# Patient Record
Sex: FEMALE | Race: WHITE | URBAN - METROPOLITAN AREA
[De-identification: names, ages, dates, MRNs, and addresses within clinical notes are randomized per-mention and may not be internally consistent; named-entity substitution may affect disease eponyms.]

---

## 2020-07-27 ENCOUNTER — HOSPITAL ENCOUNTER (OUTPATIENT)
Age: 40
Discharge: HOME | End: 2020-07-29
Payer: COMMERCIAL

## 2020-07-27 PROCEDURE — 87205 SMEAR GRAM STAIN: CPT

## 2020-07-28 LAB — GRAM STAIN ORDERABLE: NORMAL

## 2023-04-20 ENCOUNTER — OFFICE VISIT (OUTPATIENT)
Dept: PRIMARY CARE | Facility: CLINIC | Age: 43
End: 2023-04-20
Payer: COMMERCIAL

## 2023-04-20 VITALS
BODY MASS INDEX: 48.82 KG/M2 | HEIGHT: 65 IN | HEART RATE: 88 BPM | WEIGHT: 293 LBS | DIASTOLIC BLOOD PRESSURE: 74 MMHG | RESPIRATION RATE: 18 BRPM | TEMPERATURE: 97.2 F | SYSTOLIC BLOOD PRESSURE: 136 MMHG | OXYGEN SATURATION: 96 %

## 2023-04-20 DIAGNOSIS — E66.01 MORBID OBESITY (MULTI): ICD-10-CM

## 2023-04-20 DIAGNOSIS — I10 PRIMARY HYPERTENSION: Primary | ICD-10-CM

## 2023-04-20 DIAGNOSIS — F32.A DEPRESSION, UNSPECIFIED DEPRESSION TYPE: ICD-10-CM

## 2023-04-20 PROBLEM — J45.909 ASTHMA (HHS-HCC): Status: ACTIVE | Noted: 2023-04-20

## 2023-04-20 PROBLEM — D61.818 PANCYTOPENIA (MULTI): Status: ACTIVE | Noted: 2023-04-20

## 2023-04-20 PROBLEM — K21.9 GERD WITHOUT ESOPHAGITIS: Status: ACTIVE | Noted: 2023-04-20

## 2023-04-20 PROBLEM — E55.9 VITAMIN D DEFICIENCY: Status: ACTIVE | Noted: 2023-04-20

## 2023-04-20 PROCEDURE — 1036F TOBACCO NON-USER: CPT | Performed by: NURSE PRACTITIONER

## 2023-04-20 PROCEDURE — 99213 OFFICE O/P EST LOW 20 MIN: CPT | Performed by: NURSE PRACTITIONER

## 2023-04-20 PROCEDURE — 3075F SYST BP GE 130 - 139MM HG: CPT | Performed by: NURSE PRACTITIONER

## 2023-04-20 PROCEDURE — 3078F DIAST BP <80 MM HG: CPT | Performed by: NURSE PRACTITIONER

## 2023-04-20 RX ORDER — FLUTICASONE FUROATE 27.5 UG/1
2 SPRAY, METERED NASAL
COMMUNITY
End: 2024-04-22 | Stop reason: WASHOUT

## 2023-04-20 RX ORDER — ALBUTEROL SULFATE 90 UG/1
AEROSOL, METERED RESPIRATORY (INHALATION) 4 TIMES DAILY
COMMUNITY
Start: 2023-02-16

## 2023-04-20 RX ORDER — OMEPRAZOLE 20 MG/1
CAPSULE, DELAYED RELEASE ORAL
COMMUNITY
Start: 2022-07-18 | End: 2023-08-29 | Stop reason: SDUPTHER

## 2023-04-20 RX ORDER — LOSARTAN POTASSIUM 50 MG/1
1 TABLET ORAL DAILY
COMMUNITY
Start: 2022-07-18 | End: 2023-08-29 | Stop reason: SDUPTHER

## 2023-04-20 RX ORDER — FLUOXETINE HYDROCHLORIDE 40 MG/1
1 CAPSULE ORAL DAILY
COMMUNITY
Start: 2022-07-18 | End: 2023-06-06

## 2023-04-20 RX ORDER — MELOXICAM 15 MG/1
1 TABLET ORAL DAILY
COMMUNITY
Start: 2023-03-30 | End: 2024-03-26 | Stop reason: ALTCHOICE

## 2023-04-20 RX ORDER — MONTELUKAST SODIUM 4 MG/1
4 TABLET, CHEWABLE ORAL NIGHTLY
COMMUNITY
End: 2023-08-29 | Stop reason: SINTOL

## 2023-04-20 NOTE — PROGRESS NOTES
Subjective   Rhonda Pizano is a 42 y.o. female who presents for Follow-up (6month ).  Taking care of her mom at home.  Obesity- Ins will cover saxenda  but too expensive. Wants to not go down the same path as her mom.   No CP but will have fluttering occasionally.   NO abd pain N&V.  ETOH- none  No nicotene  Exercise- none  Walk at work.           The ROS have been reviewed otherwise negative except for what is in the HPI    Objective   Physical Exam  Constitutional:       Appearance: Normal appearance.   Cardiovascular:      Rate and Rhythm: Normal rate and regular rhythm.   Pulmonary:      Effort: Pulmonary effort is normal.      Breath sounds: Normal breath sounds.   Neurological:      Mental Status: She is alert and oriented to person, place, and time.   Psychiatric:         Mood and Affect: Mood normal.       Visit Vitals  /74   Pulse 88   Temp 36.2 °C (97.2 °F)   Resp 18        Assessment/Plan   Problem List Items Addressed This Visit    None    Rhonda was seen today for follow-up.  Diagnoses and all orders for this visit:  Primary hypertension (Primary)  Comments:  controlled on losartan  Orders:  -     Comprehensive Metabolic Panel; Future  -     TSH with reflex to Free T4 if abnormal; Future  -     CBC and Auto Differential; Future  Depression, unspecified depression type  Comments:  on fluoxetine  Orders:  -     TSH with reflex to Free T4 if abnormal; Future  Morbid obesity (CMS/HCC)  Comments:  she will call insurance to check diff meds.  Orders:  -     Lipid Panel; Future  -     Hemoglobin A1C; Future  -     TSH with reflex to Free T4 if abnormal; Future

## 2023-05-04 ENCOUNTER — LAB (OUTPATIENT)
Dept: LAB | Facility: LAB | Age: 43
End: 2023-05-04
Payer: COMMERCIAL

## 2023-05-04 DIAGNOSIS — F32.A DEPRESSION, UNSPECIFIED DEPRESSION TYPE: ICD-10-CM

## 2023-05-04 DIAGNOSIS — I10 PRIMARY HYPERTENSION: ICD-10-CM

## 2023-05-04 DIAGNOSIS — E66.01 MORBID OBESITY (MULTI): ICD-10-CM

## 2023-05-04 LAB
ALANINE AMINOTRANSFERASE (SGPT) (U/L) IN SER/PLAS: 16 U/L (ref 7–45)
ALBUMIN (G/DL) IN SER/PLAS: 4 G/DL (ref 3.4–5)
ALKALINE PHOSPHATASE (U/L) IN SER/PLAS: 84 U/L (ref 33–110)
ANION GAP IN SER/PLAS: 11 MMOL/L (ref 10–20)
ASPARTATE AMINOTRANSFERASE (SGOT) (U/L) IN SER/PLAS: 15 U/L (ref 9–39)
BASOPHILS (10*3/UL) IN BLOOD BY AUTOMATED COUNT: 0.07 X10E9/L (ref 0–0.1)
BASOPHILS/100 LEUKOCYTES IN BLOOD BY AUTOMATED COUNT: 0.9 % (ref 0–2)
BILIRUBIN TOTAL (MG/DL) IN SER/PLAS: 0.7 MG/DL (ref 0–1.2)
CALCIUM (MG/DL) IN SER/PLAS: 9.3 MG/DL (ref 8.6–10.3)
CARBON DIOXIDE, TOTAL (MMOL/L) IN SER/PLAS: 29 MMOL/L (ref 21–32)
CHLORIDE (MMOL/L) IN SER/PLAS: 102 MMOL/L (ref 98–107)
CHOLESTEROL (MG/DL) IN SER/PLAS: 149 MG/DL (ref 0–199)
CHOLESTEROL IN HDL (MG/DL) IN SER/PLAS: 37.1 MG/DL
CHOLESTEROL/HDL RATIO: 4
CREATININE (MG/DL) IN SER/PLAS: 0.83 MG/DL (ref 0.5–1.05)
EOSINOPHILS (10*3/UL) IN BLOOD BY AUTOMATED COUNT: 0.11 X10E9/L (ref 0–0.7)
EOSINOPHILS/100 LEUKOCYTES IN BLOOD BY AUTOMATED COUNT: 1.3 % (ref 0–6)
ERYTHROCYTE DISTRIBUTION WIDTH (RATIO) BY AUTOMATED COUNT: 13.5 % (ref 11.5–14.5)
ERYTHROCYTE MEAN CORPUSCULAR HEMOGLOBIN CONCENTRATION (G/DL) BY AUTOMATED: 32 G/DL (ref 32–36)
ERYTHROCYTE MEAN CORPUSCULAR VOLUME (FL) BY AUTOMATED COUNT: 85 FL (ref 80–100)
ERYTHROCYTES (10*6/UL) IN BLOOD BY AUTOMATED COUNT: 5.12 X10E12/L (ref 4–5.2)
GFR FEMALE: 89 ML/MIN/1.73M2
GLUCOSE (MG/DL) IN SER/PLAS: 86 MG/DL (ref 74–99)
HEMATOCRIT (%) IN BLOOD BY AUTOMATED COUNT: 43.7 % (ref 36–46)
HEMOGLOBIN (G/DL) IN BLOOD: 14 G/DL (ref 12–16)
IMMATURE GRANULOCYTES/100 LEUKOCYTES IN BLOOD BY AUTOMATED COUNT: 0.4 % (ref 0–0.9)
LDL: 90 MG/DL (ref 0–99)
LEUKOCYTES (10*3/UL) IN BLOOD BY AUTOMATED COUNT: 8.2 X10E9/L (ref 4.4–11.3)
LYMPHOCYTES (10*3/UL) IN BLOOD BY AUTOMATED COUNT: 2.03 X10E9/L (ref 1.2–4.8)
LYMPHOCYTES/100 LEUKOCYTES IN BLOOD BY AUTOMATED COUNT: 24.7 % (ref 13–44)
MONOCYTES (10*3/UL) IN BLOOD BY AUTOMATED COUNT: 0.54 X10E9/L (ref 0.1–1)
MONOCYTES/100 LEUKOCYTES IN BLOOD BY AUTOMATED COUNT: 6.6 % (ref 2–10)
NEUTROPHILS (10*3/UL) IN BLOOD BY AUTOMATED COUNT: 5.45 X10E9/L (ref 1.2–7.7)
NEUTROPHILS/100 LEUKOCYTES IN BLOOD BY AUTOMATED COUNT: 66.1 % (ref 40–80)
PLATELETS (10*3/UL) IN BLOOD AUTOMATED COUNT: 360 X10E9/L (ref 150–450)
POTASSIUM (MMOL/L) IN SER/PLAS: 4.8 MMOL/L (ref 3.5–5.3)
PROTEIN TOTAL: 6.3 G/DL (ref 6.4–8.2)
SODIUM (MMOL/L) IN SER/PLAS: 137 MMOL/L (ref 136–145)
THYROTROPIN (MIU/L) IN SER/PLAS BY DETECTION LIMIT <= 0.05 MIU/L: 1.03 MIU/L (ref 0.44–3.98)
TRIGLYCERIDE (MG/DL) IN SER/PLAS: 111 MG/DL (ref 0–149)
UREA NITROGEN (MG/DL) IN SER/PLAS: 11 MG/DL (ref 6–23)
VLDL: 22 MG/DL (ref 0–40)

## 2023-05-04 PROCEDURE — 80061 LIPID PANEL: CPT

## 2023-05-04 PROCEDURE — 36415 COLL VENOUS BLD VENIPUNCTURE: CPT

## 2023-05-04 PROCEDURE — 85025 COMPLETE CBC W/AUTO DIFF WBC: CPT

## 2023-05-04 PROCEDURE — 80053 COMPREHEN METABOLIC PANEL: CPT

## 2023-05-04 PROCEDURE — 84443 ASSAY THYROID STIM HORMONE: CPT

## 2023-05-04 PROCEDURE — 83036 HEMOGLOBIN GLYCOSYLATED A1C: CPT

## 2023-05-05 LAB
ESTIMATED AVERAGE GLUCOSE FOR HBA1C: 97 MG/DL
HEMOGLOBIN A1C/HEMOGLOBIN TOTAL IN BLOOD: 5 %

## 2023-06-04 DIAGNOSIS — J45.909 ASTHMA, UNSPECIFIED ASTHMA SEVERITY, UNSPECIFIED WHETHER COMPLICATED, UNSPECIFIED WHETHER PERSISTENT (HHS-HCC): Primary | ICD-10-CM

## 2023-06-04 DIAGNOSIS — F32.A DEPRESSION, UNSPECIFIED DEPRESSION TYPE: ICD-10-CM

## 2023-06-06 RX ORDER — FLUOXETINE HYDROCHLORIDE 40 MG/1
CAPSULE ORAL
Qty: 90 CAPSULE | Refills: 1 | Status: SHIPPED | OUTPATIENT
Start: 2023-06-06 | End: 2023-08-29 | Stop reason: SDUPTHER

## 2023-06-06 RX ORDER — MONTELUKAST SODIUM 10 MG/1
TABLET ORAL
Qty: 90 TABLET | Refills: 1 | Status: SHIPPED | OUTPATIENT
Start: 2023-06-06 | End: 2023-08-29 | Stop reason: SDUPTHER

## 2023-08-29 ENCOUNTER — OFFICE VISIT (OUTPATIENT)
Dept: PRIMARY CARE | Facility: CLINIC | Age: 43
End: 2023-08-29
Payer: COMMERCIAL

## 2023-08-29 VITALS
OXYGEN SATURATION: 98 % | BODY MASS INDEX: 50.02 KG/M2 | TEMPERATURE: 97 F | DIASTOLIC BLOOD PRESSURE: 80 MMHG | SYSTOLIC BLOOD PRESSURE: 120 MMHG | HEIGHT: 64 IN | WEIGHT: 293 LBS | HEART RATE: 87 BPM

## 2023-08-29 DIAGNOSIS — F32.A DEPRESSION, UNSPECIFIED DEPRESSION TYPE: ICD-10-CM

## 2023-08-29 DIAGNOSIS — J45.909 ASTHMA, UNSPECIFIED ASTHMA SEVERITY, UNSPECIFIED WHETHER COMPLICATED, UNSPECIFIED WHETHER PERSISTENT (HHS-HCC): ICD-10-CM

## 2023-08-29 DIAGNOSIS — K21.9 GERD WITHOUT ESOPHAGITIS: ICD-10-CM

## 2023-08-29 DIAGNOSIS — I10 PRIMARY HYPERTENSION: Primary | ICD-10-CM

## 2023-08-29 PROCEDURE — 3079F DIAST BP 80-89 MM HG: CPT

## 2023-08-29 PROCEDURE — 1036F TOBACCO NON-USER: CPT

## 2023-08-29 PROCEDURE — 99213 OFFICE O/P EST LOW 20 MIN: CPT

## 2023-08-29 PROCEDURE — 3074F SYST BP LT 130 MM HG: CPT

## 2023-08-29 RX ORDER — OMEPRAZOLE 20 MG/1
20 CAPSULE, DELAYED RELEASE ORAL
Qty: 90 CAPSULE | Refills: 1 | Status: SHIPPED | OUTPATIENT
Start: 2023-08-29 | End: 2024-01-26

## 2023-08-29 RX ORDER — FLUOXETINE HYDROCHLORIDE 40 MG/1
40 CAPSULE ORAL DAILY
Qty: 90 CAPSULE | Refills: 1 | Status: SHIPPED | OUTPATIENT
Start: 2023-08-29 | End: 2024-01-26

## 2023-08-29 RX ORDER — MONTELUKAST SODIUM 10 MG/1
10 TABLET ORAL NIGHTLY
Qty: 90 TABLET | Refills: 1 | Status: SHIPPED | OUTPATIENT
Start: 2023-08-29 | End: 2024-01-26

## 2023-08-29 RX ORDER — LOSARTAN POTASSIUM 50 MG/1
50 TABLET ORAL DAILY
Qty: 90 TABLET | Refills: 1 | Status: SHIPPED | OUTPATIENT
Start: 2023-08-29 | End: 2024-01-26

## 2023-08-29 ASSESSMENT — PATIENT HEALTH QUESTIONNAIRE - PHQ9
1. LITTLE INTEREST OR PLEASURE IN DOING THINGS: NOT AT ALL
SUM OF ALL RESPONSES TO PHQ9 QUESTIONS 1 & 2: 0
2. FEELING DOWN, DEPRESSED OR HOPELESS: NOT AT ALL

## 2023-08-29 ASSESSMENT — ENCOUNTER SYMPTOMS
PSYCHIATRIC NEGATIVE: 1
ABDOMINAL PAIN: 0
RESPIRATORY NEGATIVE: 1
MUSCULOSKELETAL NEGATIVE: 1
CARDIOVASCULAR NEGATIVE: 1
FEVER: 0
ENDOCRINE NEGATIVE: 1
HEADACHES: 0
FATIGUE: 0
LOSS OF SENSATION IN FEET: 0
DEPRESSION: 0
ALLERGIC/IMMUNOLOGIC NEGATIVE: 1
DIZZINESS: 0
SHORTNESS OF BREATH: 0
GASTROINTESTINAL NEGATIVE: 1
WEAKNESS: 0
ACTIVITY CHANGE: 0
OCCASIONAL FEELINGS OF UNSTEADINESS: 0
UNEXPECTED WEIGHT CHANGE: 0

## 2023-08-29 ASSESSMENT — SOCIAL DETERMINANTS OF HEALTH (SDOH)

## 2023-08-29 NOTE — PATIENT INSTRUCTIONS
Healthy diet and exercise  Consider intemittent fasting, limit sugars, carbohydrates    Thank you for coming in today, if any questions or concerns arise, please call my office.   Clem Schmitt, GRISELDA-CNP

## 2023-08-29 NOTE — PROGRESS NOTES
Subjective   Patient ID: Rhonda Pizano is a 42 y.o. female who presents for Establish Care (Rhonda is here to establish care. She is seeing ortho for knee pain. Needs refills. ).  Has been on Amlodipine, caused leg swelling, was on hydrochlorothiazide, did not lower the BP  Lisinopril caused severe cough.         Vitals:    08/29/23 1537   BP: 120/80   Pulse: 87   Temp: 36.1 °C (97 °F)   SpO2: 98%       Review of Systems   Constitutional:  Negative for activity change, fatigue, fever and unexpected weight change.   HENT: Negative.     Respiratory: Negative.  Negative for shortness of breath.    Cardiovascular: Negative.  Negative for chest pain.   Gastrointestinal: Negative.  Negative for abdominal pain.   Endocrine: Negative.    Musculoskeletal: Negative.    Skin: Negative.    Allergic/Immunologic: Negative.    Neurological:  Negative for dizziness, weakness and headaches.   Psychiatric/Behavioral: Negative.     All other systems reviewed and are negative.      Objective   Physical Exam  Vitals and nursing note reviewed.   Constitutional:       Appearance: Normal appearance.   HENT:      Head: Normocephalic.      Mouth/Throat:      Mouth: Mucous membranes are moist.   Cardiovascular:      Rate and Rhythm: Normal rate and regular rhythm.      Pulses: Normal pulses.      Heart sounds: Normal heart sounds. No murmur heard.     No friction rub. No gallop.   Pulmonary:      Effort: Pulmonary effort is normal. No respiratory distress.      Breath sounds: Normal breath sounds. No wheezing.   Abdominal:      General: Bowel sounds are normal. There is no distension.      Palpations: Abdomen is soft.      Tenderness: There is no abdominal tenderness.   Musculoskeletal:         General: No deformity. Normal range of motion.   Skin:     General: Skin is warm and dry.      Capillary Refill: Capillary refill takes less than 2 seconds.   Neurological:      General: No focal deficit present.      Mental Status: She is alert and  oriented to person, place, and time.   Psychiatric:         Mood and Affect: Mood normal.         Assessment/Plan   Problem List Items Addressed This Visit       Asthma    Relevant Medications    montelukast (Singulair) 10 mg tablet    Depression    Relevant Medications    FLUoxetine (PROzac) 40 mg capsule    GERD without esophagitis    Relevant Medications    omeprazole (PriLOSEC) 20 mg DR capsule    HTN (hypertension) - Primary    Relevant Medications    losartan (Cozaar) 50 mg tablet            Thank you for coming in today, please call my office if you have any concerns or questions.     Clem VILLALOBOS, CNP

## 2023-10-06 ENCOUNTER — APPOINTMENT (OUTPATIENT)
Dept: ORTHOPEDIC SURGERY | Facility: CLINIC | Age: 43
End: 2023-10-06
Payer: COMMERCIAL

## 2023-10-09 ENCOUNTER — APPOINTMENT (OUTPATIENT)
Dept: PRIMARY CARE | Facility: CLINIC | Age: 43
End: 2023-10-09
Payer: COMMERCIAL

## 2023-10-12 DIAGNOSIS — M25.561 RIGHT KNEE PAIN, UNSPECIFIED CHRONICITY: Primary | ICD-10-CM

## 2023-10-13 ENCOUNTER — OFFICE VISIT (OUTPATIENT)
Dept: ORTHOPEDIC SURGERY | Facility: CLINIC | Age: 43
End: 2023-10-13
Payer: COMMERCIAL

## 2023-10-13 DIAGNOSIS — M25.561 CHRONIC PAIN OF RIGHT KNEE: ICD-10-CM

## 2023-10-13 DIAGNOSIS — G89.29 CHRONIC PAIN OF RIGHT KNEE: ICD-10-CM

## 2023-10-13 PROBLEM — M77.11 LATERAL EPICONDYLITIS, RIGHT ELBOW: Status: ACTIVE | Noted: 2023-10-13

## 2023-10-13 PROBLEM — E03.9 HYPOTHYROIDISM: Status: ACTIVE | Noted: 2019-09-23

## 2023-10-13 PROBLEM — M65.4 RADIAL STYLOID TENOSYNOVITIS OF RIGHT HAND: Status: ACTIVE | Noted: 2021-09-03

## 2023-10-13 PROBLEM — G47.33 OSA (OBSTRUCTIVE SLEEP APNEA): Status: ACTIVE | Noted: 2019-09-23

## 2023-10-13 PROBLEM — I10 BENIGN ESSENTIAL HTN: Status: ACTIVE | Noted: 2023-10-13

## 2023-10-13 PROBLEM — S63.501A UNSPECIFIED SPRAIN OF RIGHT WRIST, INITIAL ENCOUNTER: Status: ACTIVE | Noted: 2021-09-03

## 2023-10-13 PROBLEM — S69.82XA: Status: ACTIVE | Noted: 2021-09-03

## 2023-10-13 PROBLEM — E66.01 SEVERE OBESITY (BMI >= 40) (MULTI): Status: ACTIVE | Noted: 2021-09-03

## 2023-10-13 PROBLEM — M24.9 JOINT DERANGEMENT, SHOULDER REGION: Status: ACTIVE | Noted: 2023-10-13

## 2023-10-13 PROBLEM — R53.82 CHRONIC FATIGUE: Status: ACTIVE | Noted: 2023-10-13

## 2023-10-13 PROCEDURE — 1036F TOBACCO NON-USER: CPT

## 2023-10-13 PROCEDURE — 99213 OFFICE O/P EST LOW 20 MIN: CPT

## 2023-10-13 RX ORDER — CYCLOBENZAPRINE HCL 10 MG
10 TABLET ORAL 3 TIMES DAILY PRN
COMMUNITY
Start: 2022-07-07 | End: 2024-02-23

## 2023-10-13 RX ORDER — OMEPRAZOLE 10 MG/1
10 CAPSULE, DELAYED RELEASE ORAL
COMMUNITY
End: 2024-03-26 | Stop reason: ALTCHOICE

## 2023-10-13 RX ORDER — FLUOXETINE HYDROCHLORIDE 20 MG/1
1 CAPSULE ORAL DAILY
COMMUNITY
Start: 2022-04-25 | End: 2024-03-26 | Stop reason: ALTCHOICE

## 2023-10-13 RX ORDER — DULOXETIN HYDROCHLORIDE 20 MG/1
20 CAPSULE, DELAYED RELEASE ORAL
COMMUNITY
Start: 2021-10-29 | End: 2024-03-26 | Stop reason: ALTCHOICE

## 2023-10-13 RX ORDER — ONDANSETRON 4 MG/1
1 TABLET, FILM COATED ORAL EVERY 4 HOURS
COMMUNITY
Start: 2017-01-22 | End: 2024-02-23

## 2023-10-13 RX ORDER — POTASSIUM CHLORIDE 20 MEQ/1
1 TABLET, EXTENDED RELEASE ORAL 2 TIMES DAILY
COMMUNITY
End: 2024-02-23

## 2023-10-13 RX ORDER — LORATADINE 10 MG/1
1 TABLET ORAL DAILY
COMMUNITY
Start: 2022-03-23 | End: 2024-04-22 | Stop reason: WASHOUT

## 2023-10-13 RX ORDER — IPRATROPIUM BROMIDE AND ALBUTEROL SULFATE 2.5; .5 MG/3ML; MG/3ML
3 SOLUTION RESPIRATORY (INHALATION) EVERY 6 HOURS PRN
COMMUNITY
Start: 2019-02-14 | End: 2024-03-26 | Stop reason: ALTCHOICE

## 2023-10-13 RX ORDER — ACETAMINOPHEN 325 MG/1
TABLET ORAL
COMMUNITY
End: 2024-04-04 | Stop reason: HOSPADM

## 2023-10-13 RX ORDER — DOXYCYCLINE HYCLATE 100 MG
100 TABLET ORAL 2 TIMES DAILY
COMMUNITY
End: 2024-02-23

## 2023-10-13 RX ORDER — IBUPROFEN 600 MG/1
600 TABLET ORAL EVERY 6 HOURS PRN
COMMUNITY
End: 2024-03-26 | Stop reason: ALTCHOICE

## 2023-10-13 RX ORDER — FLUTICASONE FUROATE AND VILANTEROL 100; 25 UG/1; UG/1
1-2 POWDER RESPIRATORY (INHALATION) 2 TIMES DAILY
COMMUNITY
Start: 2021-07-28 | End: 2024-02-23

## 2023-10-13 RX ORDER — ACETAMINOPHEN 500 MG
1 TABLET ORAL DAILY
COMMUNITY

## 2023-10-13 RX ORDER — CHOLECALCIFEROL (VITAMIN D3) 25 MCG
1000 TABLET ORAL DAILY
COMMUNITY
End: 2024-02-23

## 2023-10-13 RX ORDER — AZITHROMYCIN 250 MG/1
TABLET, FILM COATED ORAL 3 TIMES DAILY
COMMUNITY
Start: 2023-02-16 | End: 2024-02-23

## 2023-10-13 RX ORDER — GUAIFENESIN 100 MG/5ML
10 SOLUTION ORAL EVERY 4 HOURS PRN
COMMUNITY
Start: 2017-11-06 | End: 2024-03-26 | Stop reason: ALTCHOICE

## 2023-10-13 RX ORDER — EPINEPHRINE 0.3 MG/.3ML
INJECTION SUBCUTANEOUS
COMMUNITY
Start: 2020-05-11

## 2023-10-13 RX ORDER — CHOLECALCIFEROL (VITAMIN D3) 50 MCG
50 TABLET ORAL DAILY
COMMUNITY
Start: 2021-05-04 | End: 2024-03-26 | Stop reason: SDUPTHER

## 2023-10-13 RX ORDER — AMLODIPINE BESYLATE 5 MG/1
TABLET ORAL
COMMUNITY
End: 2024-02-23

## 2023-10-13 RX ORDER — NAPROXEN 500 MG/1
500 TABLET ORAL 2 TIMES DAILY
Qty: 60 TABLET | Refills: 0 | Status: SHIPPED | OUTPATIENT
Start: 2023-10-13 | End: 2023-11-13 | Stop reason: SDUPTHER

## 2023-10-13 RX ORDER — HYDROCHLOROTHIAZIDE 12.5 MG/1
12.5 TABLET ORAL DAILY
COMMUNITY
End: 2024-02-23

## 2023-10-13 RX ORDER — POTASSIUM CHLORIDE 750 MG/1
10 TABLET, FILM COATED, EXTENDED RELEASE ORAL DAILY
COMMUNITY
Start: 2021-04-05 | End: 2024-02-23

## 2023-10-13 RX ORDER — HYDROCHLOROTHIAZIDE 25 MG/1
.5-1 TABLET ORAL DAILY
COMMUNITY
Start: 2021-11-16 | End: 2024-02-23

## 2023-10-13 RX ORDER — DULOXETIN HYDROCHLORIDE 60 MG/1
1 CAPSULE, DELAYED RELEASE ORAL DAILY
COMMUNITY
End: 2024-03-26 | Stop reason: ALTCHOICE

## 2023-10-13 ASSESSMENT — PAIN DESCRIPTION - DESCRIPTORS: DESCRIPTORS: PINS AND NEEDLES

## 2023-10-13 ASSESSMENT — PAIN - FUNCTIONAL ASSESSMENT: PAIN_FUNCTIONAL_ASSESSMENT: 0-10

## 2023-10-13 ASSESSMENT — PAIN SCALES - GENERAL: PAINLEVEL_OUTOF10: 8

## 2023-10-13 NOTE — PROGRESS NOTES
HPI  Rhonda Pizano is a 42 y.o. female  in office today for follow up of  Chief Complaint   Patient presents with    Right Knee - Pain     Pain not improving    .  she has had pain for over 3 months now.  We tried a cortisone injection in the past which only helped for a couple weeks.  PT orders have been given, she has been doing home exercises versus formal PT.  She has been trying Meloxicam and Tylenol for pain with little success.      Medication  Current Outpatient Medications on File Prior to Visit   Medication Sig Dispense Refill    albuterol 90 mcg/actuation inhaler Inhale 4 times a day.      FLUoxetine (PROzac) 40 mg capsule Take 1 capsule (40 mg) by mouth once daily. 90 capsule 1    fluticasone (Flonase Sensimist) 27.5 mcg/actuation nasal spray Administer 2 sprays into each nostril once daily.      loratadine-pseudoephedrine (Claritin-D 12-hour) 5-120 mg 12 hr tablet Take 1 tablet by mouth 2 times a day. Do not crush, chew, or split.      losartan (Cozaar) 50 mg tablet Take 1 tablet (50 mg) by mouth once daily. 90 tablet 1    meloxicam (Mobic) 15 mg tablet Take 1 tablet (15 mg) by mouth once daily. With food      montelukast (Singulair) 10 mg tablet Take 1 tablet (10 mg) by mouth once daily at bedtime. 90 tablet 1    omeprazole (PriLOSEC) 20 mg DR capsule Take 1 capsule (20 mg) by mouth once daily in the morning. Take before meals. 90 capsule 1     No current facility-administered medications on file prior to visit.       Physical Exam  Constitutional: well developed, well nourished obese female in no acute distress  Psychiatric: normal mood, appropriate affect  Eyes: sclera anicteric  HENT: normocephalic/atraumatic  CV: regular rate and rhythm   Respiratory: non labored breathing  Integumentary: no rash  Neurological: moves all extremities    Right Knee Exam     Tenderness   The patient is experiencing tenderness in the lateral joint line.    Range of Motion   Extension:  0   Flexion:  100     Tests    Prabha:  Medial - negative Lateral - positive  Varus: negative Valgus: negative  Drawer:  Anterior - negative    Posterior - negative  Patellar apprehension: negative    Other   Erythema: absent  Scars: absent  Sensation: normal  Swelling: none    Comments:  Ambulating independently with moderate limp          Imaging:  No new images today    Assessment  Assessment: Right knee pain    Plan  Plan:  History, physical exam, and imaging were reviewed with patient. Discussed it still being beneficial to get to formal PT versus her doing exercises at home based upon another person's knee issues.  Formal PT would be more catered to her issues.  But given persistence of pain with conservative treatment, positive Prabha, ordering MRI to assess for meniscus injury and internal derangement.  Additionally gave patient orders for TENS unit and brace as we do not have any in stock that would adequately fit the patient.  Medication: Prescription of Naproxen sent to pharmacy to be taken as needed.  Follow Up: After MRI to discuss results and next steps    All questions were answered for the patient prior to end of exam and patient addressed their understanding.    Snehal Juarez PA-C  10/13/23

## 2023-11-10 ENCOUNTER — TELEPHONE (OUTPATIENT)
Dept: ORTHOPEDIC SURGERY | Facility: CLINIC | Age: 43
End: 2023-11-10
Payer: COMMERCIAL

## 2023-11-10 NOTE — TELEPHONE ENCOUNTER
Patient called to see if she could get a refill the naproxen. And she has the mri scheduled 11/25/23

## 2023-11-13 DIAGNOSIS — M25.561 CHRONIC PAIN OF RIGHT KNEE: ICD-10-CM

## 2023-11-13 DIAGNOSIS — G89.29 CHRONIC PAIN OF RIGHT KNEE: ICD-10-CM

## 2023-11-13 RX ORDER — NAPROXEN 500 MG/1
500 TABLET ORAL 2 TIMES DAILY
Qty: 60 TABLET | Refills: 0 | Status: SHIPPED | OUTPATIENT
Start: 2023-11-13 | End: 2023-12-13

## 2023-11-25 ENCOUNTER — HOSPITAL ENCOUNTER (OUTPATIENT)
Dept: RADIOLOGY | Facility: HOSPITAL | Age: 43
Discharge: HOME | End: 2023-11-25
Payer: COMMERCIAL

## 2023-11-25 DIAGNOSIS — G89.29 CHRONIC PAIN OF RIGHT KNEE: ICD-10-CM

## 2023-11-25 DIAGNOSIS — M25.561 CHRONIC PAIN OF RIGHT KNEE: ICD-10-CM

## 2023-11-25 PROCEDURE — 73721 MRI JNT OF LWR EXTRE W/O DYE: CPT | Mod: RIGHT SIDE | Performed by: RADIOLOGY

## 2023-11-25 PROCEDURE — 73721 MRI JNT OF LWR EXTRE W/O DYE: CPT | Mod: RT

## 2023-11-28 ENCOUNTER — TELEPHONE (OUTPATIENT)
Dept: ORTHOPEDIC SURGERY | Facility: CLINIC | Age: 43
End: 2023-11-28
Payer: COMMERCIAL

## 2023-11-28 NOTE — TELEPHONE ENCOUNTER
Spoke with patient about her MRI per Snehal Juarez PA-C  patient does have a meniscus tear in her right knee. She was advised to come see Dr Rosa our knee specialist to go over that with him and discuss options but she said that she wants to think about it and will call the office if she changes her mind

## 2023-11-30 ENCOUNTER — TELEPHONE (OUTPATIENT)
Dept: PRIMARY CARE | Facility: CLINIC | Age: 43
End: 2023-11-30

## 2023-11-30 ENCOUNTER — OFFICE VISIT (OUTPATIENT)
Dept: PRIMARY CARE | Facility: CLINIC | Age: 43
End: 2023-11-30
Payer: COMMERCIAL

## 2023-11-30 VITALS
WEIGHT: 293 LBS | HEART RATE: 94 BPM | BODY MASS INDEX: 55.04 KG/M2 | DIASTOLIC BLOOD PRESSURE: 80 MMHG | OXYGEN SATURATION: 96 % | TEMPERATURE: 98.1 F | SYSTOLIC BLOOD PRESSURE: 140 MMHG

## 2023-11-30 DIAGNOSIS — J02.0 STREP PHARYNGITIS: Primary | ICD-10-CM

## 2023-11-30 DIAGNOSIS — J02.9 SORE THROAT: ICD-10-CM

## 2023-11-30 DIAGNOSIS — R93.6 ABNORMAL MRI, KNEE: ICD-10-CM

## 2023-11-30 LAB — POC RAPID STREP: POSITIVE

## 2023-11-30 PROCEDURE — 3079F DIAST BP 80-89 MM HG: CPT

## 2023-11-30 PROCEDURE — 3077F SYST BP >= 140 MM HG: CPT

## 2023-11-30 PROCEDURE — 1036F TOBACCO NON-USER: CPT

## 2023-11-30 PROCEDURE — 99214 OFFICE O/P EST MOD 30 MIN: CPT

## 2023-11-30 PROCEDURE — 87880 STREP A ASSAY W/OPTIC: CPT

## 2023-11-30 RX ORDER — PENICILLIN V POTASSIUM 500 MG/1
500 TABLET, FILM COATED ORAL 3 TIMES DAILY
Qty: 30 TABLET | Refills: 0 | Status: SHIPPED | OUTPATIENT
Start: 2023-11-30 | End: 2023-12-10

## 2023-11-30 RX ORDER — LIDOCAINE HYDROCHLORIDE 20 MG/ML
1.25 SOLUTION OROPHARYNGEAL AS NEEDED
Qty: 100 ML | Refills: 0 | Status: SHIPPED | OUTPATIENT
Start: 2023-11-30 | End: 2023-12-05

## 2023-11-30 ASSESSMENT — ENCOUNTER SYMPTOMS
HEMATOLOGIC/LYMPHATIC NEGATIVE: 1
GASTROINTESTINAL NEGATIVE: 1
FEVER: 0
RHINORRHEA: 1
ALLERGIC/IMMUNOLOGIC NEGATIVE: 1
PSYCHIATRIC NEGATIVE: 1
ENDOCRINE NEGATIVE: 1
EYE DISCHARGE: 1
NEUROLOGICAL NEGATIVE: 1
FATIGUE: 1
SORE THROAT: 1
RESPIRATORY NEGATIVE: 1
CARDIOVASCULAR NEGATIVE: 1
EYE ITCHING: 1
MUSCULOSKELETAL NEGATIVE: 1
SINUS PRESSURE: 1

## 2023-11-30 NOTE — PROGRESS NOTES
Subjective   Patient ID: Rhonda Pizano is a 43 y.o. female who presents for Sore Throat (Rhonda is here for a sore throat that has been going on for about 4 days. Does have issues swallowing and talking. Does have post nasal drainage. ).  HPI    Vitals:    11/30/23 1117   BP: 140/80   Pulse: 94   Temp: 36.7 °C (98.1 °F)   SpO2: 96%       Review of Systems   Constitutional:  Positive for fatigue. Negative for fever.   HENT:  Positive for congestion, dental problem, postnasal drip, rhinorrhea, sinus pressure and sore throat. Negative for hearing loss.    Eyes:  Positive for discharge and itching.   Respiratory: Negative.     Cardiovascular: Negative.    Gastrointestinal: Negative.    Endocrine: Negative.    Genitourinary: Negative.    Musculoskeletal: Negative.    Skin: Negative.    Allergic/Immunologic: Negative.    Neurological: Negative.    Hematological: Negative.    Psychiatric/Behavioral: Negative.         Objective   Physical Exam  Nursing note reviewed.   Constitutional:       Appearance: She is ill-appearing.   HENT:      Head: Normocephalic.      Nose: Congestion and rhinorrhea present.      Mouth/Throat:      Mouth: Mucous membranes are moist.      Pharynx: Oropharynx is clear. Posterior oropharyngeal erythema present.   Eyes:      General:         Right eye: Discharge present.         Left eye: Discharge present.  Neurological:      Mental Status: She is alert.         Assessment/Plan   Problem List Items Addressed This Visit       Strep pharyngitis - Primary     Other Visit Diagnoses       Sore throat        Relevant Medications    penicillin v potassium (Veetid) 500 mg tablet    lidocaine (Lidocaine Viscous) 2 % solution    Other Relevant Orders    POCT Rapid Strep A manually resulted (Completed)    Abnormal MRI, knee        Relevant Orders    Referral to Hematology and Oncology                 Thank you for coming in today, please call my office if you have any concerns or questions.     Clem DUNBAR  Oskar VILLALOBOS, CNP

## 2023-11-30 NOTE — TELEPHONE ENCOUNTER
Rhonda called inquiring about her medications. Attempted to contact patient and spouse both phones are ringing busy. Will call again.

## 2023-11-30 NOTE — LETTER
November 30, 2023     Patient: Rhonda Pizano   YOB: 1980   Date of Visit: 11/30/2023       To Whom It May Concern:    Rhonda Pizano was seen in my clinic on 11/30/2023 at 11:15 am. Please excuse Rhonda for her absence from work on this day to make the appointment. Please Excust Rhonda for 12/1/23 due to illness.    If you have any questions or concerns, please don't hesitate to call.         Sincerely,         GRISELDA Ambrosio-CNP        CC: No Recipients

## 2023-12-01 ENCOUNTER — APPOINTMENT (OUTPATIENT)
Dept: ORTHOPEDIC SURGERY | Facility: CLINIC | Age: 43
End: 2023-12-01
Payer: COMMERCIAL

## 2023-12-12 ENCOUNTER — TELEPHONE (OUTPATIENT)
Dept: HEMATOLOGY/ONCOLOGY | Facility: HOSPITAL | Age: 43
End: 2023-12-12
Payer: COMMERCIAL

## 2023-12-12 NOTE — TELEPHONE ENCOUNTER
Patient called to get a NPV with JUDSON ARROYO. Patient was referred by her PCP due to a abnormal MRI of the Knee. Scheduled patient for January 8th, 2024, @ 2:00pm. Patient verbalized and agreed to appointment.

## 2023-12-13 NOTE — TELEPHONE ENCOUNTER
Spoke with Rhonda and she did receive medications,    Penicillin is causing her hot flashes and a burst of energy for about an hour. States she will keep taking.    84933 (Hospital discharge day management; 30 min or less) 24651 (Hospital discharge day management; 30 min or less)

## 2023-12-15 ENCOUNTER — OFFICE VISIT (OUTPATIENT)
Dept: ORTHOPEDIC SURGERY | Facility: CLINIC | Age: 43
End: 2023-12-15
Payer: COMMERCIAL

## 2023-12-15 DIAGNOSIS — G89.29 CHRONIC PAIN OF RIGHT KNEE: Primary | ICD-10-CM

## 2023-12-15 DIAGNOSIS — S83.231D COMPLEX TEAR OF MEDIAL MENISCUS OF RIGHT KNEE AS CURRENT INJURY, SUBSEQUENT ENCOUNTER: ICD-10-CM

## 2023-12-15 DIAGNOSIS — M25.561 CHRONIC PAIN OF RIGHT KNEE: Primary | ICD-10-CM

## 2023-12-15 PROCEDURE — 1036F TOBACCO NON-USER: CPT

## 2023-12-15 PROCEDURE — 99212 OFFICE O/P EST SF 10 MIN: CPT

## 2023-12-15 PROCEDURE — 20610 DRAIN/INJ JOINT/BURSA W/O US: CPT

## 2023-12-15 RX ORDER — TRIAMCINOLONE ACETONIDE 40 MG/ML
40 INJECTION, SUSPENSION INTRA-ARTICULAR; INTRAMUSCULAR
Status: COMPLETED | OUTPATIENT
Start: 2023-12-15 | End: 2023-12-15

## 2023-12-15 RX ORDER — LIDOCAINE HYDROCHLORIDE 5 MG/ML
4 INJECTION, SOLUTION INFILTRATION; PERINEURAL
Status: COMPLETED | OUTPATIENT
Start: 2023-12-15 | End: 2023-12-15

## 2023-12-15 RX ADMIN — TRIAMCINOLONE ACETONIDE 40 MG: 40 INJECTION, SUSPENSION INTRA-ARTICULAR; INTRAMUSCULAR at 14:09

## 2023-12-15 RX ADMIN — LIDOCAINE HYDROCHLORIDE 4 ML: 5 INJECTION, SOLUTION INFILTRATION; PERINEURAL at 14:09

## 2023-12-15 NOTE — PROGRESS NOTES
HPI  Rhonda Pizano is a 43 y.o. female in office today for follow up of side: right knee pain and MRI review.  she continues to have pain daily with the right knee, worse as the day goes on.      Physical Exam  Constitutional: well developed, well nourished female in no acute distress  Psychiatric: normal mood, appropriate affect  Eyes: sclera anicteric  HENT: normocephalic/atraumatic  CV: regular rate and rhythm   Respiratory: non labored breathing  Integumentary: no rash  Neurological: moves all extremities    Right Knee Exam     Tenderness   The patient is experiencing tenderness in the medial joint line.    Range of Motion   Extension:  0   Flexion:  110     Other   Erythema: absent  Scars: absent  Sensation: normal  Swelling: none        Patient ID: Rhonda Pizano is a 43 y.o. female.    L Inj/Asp: R knee on 12/15/2023 2:09 PM  Indications: pain  Details: 22 G needle, anterolateral approach  Medications: 40 mg triamcinolone acetonide 40 mg/mL; 4 mL lidocaine 5 mg/mL (0.5 %)  Outcome: tolerated well, no immediate complications  Procedure, treatment alternatives, risks and benefits explained, specific risks discussed. Consent was given by the patient. Immediately prior to procedure a time out was called to verify the correct patient, procedure, equipment, support staff and site/side marked as required. Patient was prepped and draped in the usual sterile fashion.           Imaging/Lab:  MRI was completed 11/25/23 which was read by radiology and shows a complex radioal tear of the body of the medial meniscus with extension to the posterior horn.  Mild cartilage loss of the medial an patellofemoral compartments.  Small effusion.  Hematopoietic marrow reconversion is the disal femur and proximal tibia which is nonspecific.    Assessment  Assessment: Right medial meniscus tear.    Plan  Plan:  History, physical exam, and imaging were reviewed with patient. Patient considering surgical options, but wanting to wait 3-4  months.  Recommending she follow up with Dr Rosa 6-8 weeks before she is considering a good time for surgery so she can discuss with him and get on his surgical schedule if applicable.  In the meantime, going to repeat the cortisone injection to give her some relief from pain.  Right knee injected per above.  Again discussed PT as another option.  Follow Up: Patient to follow up as needed if pain persists or gets worse.      All questions were answered for the patient prior to end of exam and patient addressed their understanding.    Snehal Juarez PA-C  12/15/23

## 2023-12-21 ENCOUNTER — TELEPHONE (OUTPATIENT)
Dept: ORTHOPEDIC SURGERY | Facility: CLINIC | Age: 43
End: 2023-12-21
Payer: COMMERCIAL

## 2023-12-21 DIAGNOSIS — S83.242D OTHER TEAR OF MEDIAL MENISCUS OF LEFT KNEE, UNSPECIFIED WHETHER OLD OR CURRENT TEAR, SUBSEQUENT ENCOUNTER: ICD-10-CM

## 2023-12-21 RX ORDER — NAPROXEN 500 MG/1
500 TABLET ORAL 2 TIMES DAILY PRN
Qty: 90 TABLET | Refills: 0 | Status: SHIPPED | OUTPATIENT
Start: 2023-12-21 | End: 2024-02-04

## 2023-12-21 NOTE — TELEPHONE ENCOUNTER
12/15/23 rt knee mri review   Patient would like refill on Naproxen.  [facility] [place]

## 2023-12-28 ENCOUNTER — APPOINTMENT (OUTPATIENT)
Dept: PRIMARY CARE | Facility: CLINIC | Age: 43
End: 2023-12-28
Payer: COMMERCIAL

## 2023-12-28 ENCOUNTER — HOSPITAL ENCOUNTER (EMERGENCY)
Facility: HOSPITAL | Age: 43
Discharge: HOME | End: 2023-12-28
Attending: EMERGENCY MEDICINE
Payer: COMMERCIAL

## 2023-12-28 ENCOUNTER — APPOINTMENT (OUTPATIENT)
Dept: CARDIOLOGY | Facility: HOSPITAL | Age: 43
End: 2023-12-28
Payer: COMMERCIAL

## 2023-12-28 VITALS
RESPIRATION RATE: 17 BRPM | DIASTOLIC BLOOD PRESSURE: 85 MMHG | OXYGEN SATURATION: 97 % | HEIGHT: 66 IN | HEART RATE: 62 BPM | TEMPERATURE: 97.1 F | WEIGHT: 293 LBS | SYSTOLIC BLOOD PRESSURE: 155 MMHG | BODY MASS INDEX: 47.09 KG/M2

## 2023-12-28 DIAGNOSIS — U07.1 COVID-19: Primary | ICD-10-CM

## 2023-12-28 LAB
ALBUMIN SERPL BCP-MCNC: 4 G/DL (ref 3.4–5)
ALP SERPL-CCNC: 84 U/L (ref 33–110)
ALT SERPL W P-5'-P-CCNC: 12 U/L (ref 7–45)
ANION GAP SERPL CALC-SCNC: 11 MMOL/L (ref 10–20)
APPEARANCE UR: CLEAR
AST SERPL W P-5'-P-CCNC: 12 U/L (ref 9–39)
BASE EXCESS BLDV CALC-SCNC: 2.4 MMOL/L (ref -2–3)
BASOPHILS # BLD AUTO: 0.05 X10*3/UL (ref 0–0.1)
BASOPHILS NFR BLD AUTO: 0.5 %
BILIRUB SERPL-MCNC: 0.4 MG/DL (ref 0–1.2)
BILIRUB UR STRIP.AUTO-MCNC: NEGATIVE MG/DL
BNP SERPL-MCNC: 44 PG/ML (ref 0–99)
BODY TEMPERATURE: ABNORMAL
BUN SERPL-MCNC: 9 MG/DL (ref 6–23)
CALCIUM SERPL-MCNC: 8.9 MG/DL (ref 8.6–10.3)
CARDIAC TROPONIN I PNL SERPL HS: <3 NG/L (ref 0–13)
CARDIAC TROPONIN I PNL SERPL HS: <3 NG/L (ref 0–13)
CHLORIDE SERPL-SCNC: 103 MMOL/L (ref 98–107)
CO2 SERPL-SCNC: 25 MMOL/L (ref 21–32)
COLOR UR: YELLOW
CREAT SERPL-MCNC: 0.82 MG/DL (ref 0.5–1.05)
D DIMER PPP FEU-MCNC: 300 NG/ML FEU
EOSINOPHIL # BLD AUTO: 0.11 X10*3/UL (ref 0–0.7)
EOSINOPHIL NFR BLD AUTO: 1.1 %
ERYTHROCYTE [DISTWIDTH] IN BLOOD BY AUTOMATED COUNT: 14.6 % (ref 11.5–14.5)
ERYTHROCYTE [SEDIMENTATION RATE] IN BLOOD BY WESTERGREN METHOD: 22 MM/H (ref 0–20)
FLUAV RNA RESP QL NAA+PROBE: NOT DETECTED
FLUBV RNA RESP QL NAA+PROBE: NOT DETECTED
GFR SERPL CREATININE-BSD FRML MDRD: >90 ML/MIN/1.73M*2
GLUCOSE SERPL-MCNC: 108 MG/DL (ref 74–99)
GLUCOSE UR STRIP.AUTO-MCNC: NEGATIVE MG/DL
HCO3 BLDV-SCNC: 27.2 MMOL/L (ref 22–26)
HCT VFR BLD AUTO: 42.3 % (ref 36–46)
HGB BLD-MCNC: 13.8 G/DL (ref 12–16)
HOLD SPECIMEN: NORMAL
IMM GRANULOCYTES # BLD AUTO: 0.02 X10*3/UL (ref 0–0.7)
IMM GRANULOCYTES NFR BLD AUTO: 0.2 % (ref 0–0.9)
INHALED O2 CONCENTRATION: 97 %
KETONES UR STRIP.AUTO-MCNC: NEGATIVE MG/DL
LACTATE SERPL-SCNC: 1.3 MMOL/L (ref 0.4–2)
LEUKOCYTE ESTERASE UR QL STRIP.AUTO: NEGATIVE
LIPASE SERPL-CCNC: 10 U/L (ref 9–82)
LYMPHOCYTES # BLD AUTO: 1.66 X10*3/UL (ref 1.2–4.8)
LYMPHOCYTES NFR BLD AUTO: 16.9 %
MCH RBC QN AUTO: 27.8 PG (ref 26–34)
MCHC RBC AUTO-ENTMCNC: 32.6 G/DL (ref 32–36)
MCV RBC AUTO: 85 FL (ref 80–100)
MONOCYTES # BLD AUTO: 0.99 X10*3/UL (ref 0.1–1)
MONOCYTES NFR BLD AUTO: 10.1 %
NEUTROPHILS # BLD AUTO: 6.99 X10*3/UL (ref 1.2–7.7)
NEUTROPHILS NFR BLD AUTO: 71.2 %
NITRITE UR QL STRIP.AUTO: NEGATIVE
NRBC BLD-RTO: 0 /100 WBCS (ref 0–0)
OXYHGB MFR BLDV: 70 % (ref 45–75)
PCO2 BLDV: 42 MM HG (ref 41–51)
PH BLDV: 7.42 PH (ref 7.33–7.43)
PH UR STRIP.AUTO: 5 [PH]
PLATELET # BLD AUTO: 305 X10*3/UL (ref 150–450)
PO2 BLDV: 43 MM HG (ref 35–45)
POTASSIUM SERPL-SCNC: 3.7 MMOL/L (ref 3.5–5.3)
PROT SERPL-MCNC: 6.6 G/DL (ref 6.4–8.2)
PROT UR STRIP.AUTO-MCNC: NEGATIVE MG/DL
RBC # BLD AUTO: 4.96 X10*6/UL (ref 4–5.2)
RBC # UR STRIP.AUTO: NEGATIVE /UL
SAO2 % BLDV: 71 % (ref 45–75)
SARS-COV-2 RNA RESP QL NAA+PROBE: DETECTED
SODIUM SERPL-SCNC: 135 MMOL/L (ref 136–145)
SP GR UR STRIP.AUTO: 1.01
TEST COMMENT: ABNORMAL
UROBILINOGEN UR STRIP.AUTO-MCNC: <2 MG/DL
WBC # BLD AUTO: 9.8 X10*3/UL (ref 4.4–11.3)

## 2023-12-28 PROCEDURE — 96361 HYDRATE IV INFUSION ADD-ON: CPT

## 2023-12-28 PROCEDURE — 87636 SARSCOV2 & INF A&B AMP PRB: CPT | Performed by: EMERGENCY MEDICINE

## 2023-12-28 PROCEDURE — 83880 ASSAY OF NATRIURETIC PEPTIDE: CPT | Performed by: EMERGENCY MEDICINE

## 2023-12-28 PROCEDURE — 83605 ASSAY OF LACTIC ACID: CPT | Performed by: EMERGENCY MEDICINE

## 2023-12-28 PROCEDURE — 99284 EMERGENCY DEPT VISIT MOD MDM: CPT | Performed by: EMERGENCY MEDICINE

## 2023-12-28 PROCEDURE — 81003 URINALYSIS AUTO W/O SCOPE: CPT | Performed by: EMERGENCY MEDICINE

## 2023-12-28 PROCEDURE — 82805 BLOOD GASES W/O2 SATURATION: CPT | Performed by: EMERGENCY MEDICINE

## 2023-12-28 PROCEDURE — 36415 COLL VENOUS BLD VENIPUNCTURE: CPT | Performed by: EMERGENCY MEDICINE

## 2023-12-28 PROCEDURE — 80053 COMPREHEN METABOLIC PANEL: CPT | Performed by: EMERGENCY MEDICINE

## 2023-12-28 PROCEDURE — 83690 ASSAY OF LIPASE: CPT | Performed by: EMERGENCY MEDICINE

## 2023-12-28 PROCEDURE — 2500000004 HC RX 250 GENERAL PHARMACY W/ HCPCS (ALT 636 FOR OP/ED): Performed by: EMERGENCY MEDICINE

## 2023-12-28 PROCEDURE — 85652 RBC SED RATE AUTOMATED: CPT | Performed by: EMERGENCY MEDICINE

## 2023-12-28 PROCEDURE — 84484 ASSAY OF TROPONIN QUANT: CPT | Mod: 91 | Performed by: EMERGENCY MEDICINE

## 2023-12-28 PROCEDURE — 85025 COMPLETE CBC W/AUTO DIFF WBC: CPT | Performed by: EMERGENCY MEDICINE

## 2023-12-28 PROCEDURE — 96360 HYDRATION IV INFUSION INIT: CPT

## 2023-12-28 PROCEDURE — 87040 BLOOD CULTURE FOR BACTERIA: CPT | Mod: 91,CONLAB | Performed by: EMERGENCY MEDICINE

## 2023-12-28 PROCEDURE — 99283 EMERGENCY DEPT VISIT LOW MDM: CPT | Mod: 25

## 2023-12-28 PROCEDURE — 84484 ASSAY OF TROPONIN QUANT: CPT | Performed by: EMERGENCY MEDICINE

## 2023-12-28 PROCEDURE — 93005 ELECTROCARDIOGRAM TRACING: CPT

## 2023-12-28 PROCEDURE — 85379 FIBRIN DEGRADATION QUANT: CPT | Performed by: EMERGENCY MEDICINE

## 2023-12-28 RX ORDER — SODIUM CHLORIDE 9 MG/ML
125 INJECTION, SOLUTION INTRAVENOUS CONTINUOUS
Status: DISCONTINUED | OUTPATIENT
Start: 2023-12-28 | End: 2023-12-28 | Stop reason: HOSPADM

## 2023-12-28 RX ORDER — ACETAMINOPHEN 325 MG/1
650 TABLET ORAL ONCE
Status: COMPLETED | OUTPATIENT
Start: 2023-12-28 | End: 2023-12-28

## 2023-12-28 RX ADMIN — SODIUM CHLORIDE 125 ML/HR: 9 INJECTION, SOLUTION INTRAVENOUS at 13:47

## 2023-12-28 RX ADMIN — ACETAMINOPHEN 650 MG: 325 TABLET ORAL at 13:46

## 2023-12-28 RX ADMIN — SODIUM CHLORIDE 1000 ML: 9 INJECTION, SOLUTION INTRAVENOUS at 13:43

## 2023-12-28 ASSESSMENT — COLUMBIA-SUICIDE SEVERITY RATING SCALE - C-SSRS
6. HAVE YOU EVER DONE ANYTHING, STARTED TO DO ANYTHING, OR PREPARED TO DO ANYTHING TO END YOUR LIFE?: NO
2. HAVE YOU ACTUALLY HAD ANY THOUGHTS OF KILLING YOURSELF?: NO
1. IN THE PAST MONTH, HAVE YOU WISHED YOU WERE DEAD OR WISHED YOU COULD GO TO SLEEP AND NOT WAKE UP?: NO

## 2023-12-28 ASSESSMENT — PAIN SCALES - GENERAL
PAINLEVEL_OUTOF10: 4
PAINLEVEL_OUTOF10: 5 - MODERATE PAIN
PAINLEVEL_OUTOF10: 5 - MODERATE PAIN

## 2023-12-28 ASSESSMENT — PAIN - FUNCTIONAL ASSESSMENT
PAIN_FUNCTIONAL_ASSESSMENT: 0-10

## 2023-12-28 NOTE — DISCHARGE INSTRUCTIONS
Tylenol Advil for aches and pains alternate every 4-6 hours.  Encourage oral fluids.  Use a coolmist vaporizer in the bedroom.  Take over-the-counter cough and cold medications as needed.    Call your doctor today for follow-up in 7 to 10 days.    Return the ER for worsening symptoms or concerns.

## 2023-12-28 NOTE — ED PROVIDER NOTES
[facility]  ED  Provider Note  12/28/2023  1:14 PM  AC05/AC05        History of Present Illness:   Rhonda Pizano is a 43 y.o. female presenting to the ED for body aches chills cough, beginning several days ago.  The complaint has been persistent, moderate in severity, and worsened by nothing.  Patient complains of sharp chest pains body aches headache cough shortness of breath and feeling poorly all over.  She felt chilled at home did not take her temperature.  She has had no diarrhea.  She had 2 episodes of vomiting earlier today.  She has no known COVID, influenza or RSV exposure.  Her  has not been ill.      Review of Systems:   Pertinent positives and review of systems as noted above.  Remaining 10 review of systems is negative or noncontributory to today's episode of care.  Review of Systems       --------------------------------------------- PAST HISTORY ---------------------------------------------  Past Medical History: Asthma, hypertension, depression, anxiety    Past Surgical History:  has no past surgical history on file.    Social History:  reports that she has quit smoking. Her smoking use included cigarettes. She has never used smokeless tobacco. She reports that she does not currently use alcohol. She reports that she does not currently use drugs.    Family History: family history is not on file. Unless otherwise noted, family history is non contributory    Patient's Medications   New Prescriptions    No medications on file   Previous Medications    ACETAMINOPHEN (TYLENOL) 325 MG TABLET        ALBUTEROL 90 MCG/ACTUATION INHALER    Inhale 4 times a day.    AMLODIPINE (NORVASC) 5 MG TABLET        AZITHROMYCIN (ZITHROMAX Z-XI) 250 MG TABLET    Take by mouth 3 times a day.    CHOLECALCIFEROL (VITAMIN D-3) 25 MCG (1000 UT) TABLET    Take 1 tablet (1,000 Units) by mouth once daily.    CHOLECALCIFEROL (VITAMIN D-3) 5,000 UNITS TABLET    Take 1 tablet (5,000 Units) by mouth once  daily.    CHOLECALCIFEROL (VITAMIN D-3) 50 MCG (2000 UT) TABLET    Take 1 tablet (50 mcg) by mouth once daily.    CYCLOBENZAPRINE (FLEXERIL) 10 MG TABLET    Take 1 tablet (10 mg) by mouth 3 times a day as needed for muscle spasms.    DOXYCYCLINE (VIBRA-TABS) 100 MG TABLET    Take 1 tablet (100 mg) by mouth 2 times a day.    DULOXETINE (CYMBALTA) 20 MG DR CAPSULE    Take 1 capsule (20 mg) by mouth once daily.    DULOXETINE (CYMBALTA) 60 MG DR CAPSULE    Take 1 capsule (60 mg) by mouth once daily.    EPINEPHRINE 0.3 MG/0.3 ML INJECTION SYRINGE    INJECT THE CONTENTS OF ONE SYRINGE one TIME AS NEEDED FOR ALLERGIC REACTION.    FLUOXETINE (PROZAC) 20 MG CAPSULE    Take 1 capsule (20 mg) by mouth once daily.    FLUOXETINE (PROZAC) 40 MG CAPSULE    Take 1 capsule (40 mg) by mouth once daily.    FLUTICASONE (FLONASE SENSIMIST) 27.5 MCG/ACTUATION NASAL SPRAY    Administer 2 sprays into each nostril once daily.    FLUTICASONE FUROATE-VILANTEROL (BREO ELLIPTA) 100-25 MCG/DOSE INHALER    Inhale 1-2 puffs 2 times a day. RINSE MOUTH AFTER USE    GUAIFENESIN (ROBITUSSIN) 100 MG/5 ML SYRUP    10 mL every 4 hours if needed.    HYDROCHLOROTHIAZIDE (HYDRODIURIL) 12.5 MG TABLET    Take 1 tablet (12.5 mg) by mouth once daily.    HYDROCHLOROTHIAZIDE (HYDRODIURIL) 25 MG TABLET    Take 0.5-1 tablets (12.5-25 mg) by mouth once daily.    IBUPROFEN 600 MG TABLET    Take 1 tablet (600 mg) by mouth every 6 hours if needed (pain).    IPRATROPIUM-ALBUTEROL (DUO-NEB) 0.5-2.5 MG/3 ML NEBULIZER SOLUTION    Take 3 mL by nebulization every 6 hours if needed.    LORATADINE (CLARITIN) 10 MG TABLET    Take 1 tablet (10 mg) by mouth once daily.    LORATADINE-PSEUDOEPHEDRINE (CLARITIN-D 12-HOUR) 5-120 MG 12 HR TABLET    Take 1 tablet by mouth 2 times a day. Do not crush, chew, or split.    LOSARTAN (COZAAR) 50 MG TABLET    Take 1 tablet (50 mg) by mouth once daily.    MELOXICAM (MOBIC) 15 MG TABLET    Take 1 tablet (15 mg) by mouth once daily. With food     MONTELUKAST (SINGULAIR) 10 MG TABLET    Take 1 tablet (10 mg) by mouth once daily at bedtime.    NAPROXEN (NAPROSYN) 500 MG TABLET    Take 1 tablet (500 mg) by mouth 2 times a day as needed for mild pain (1 - 3).    OMEPRAZOLE (PRILOSEC) 10 MG DR CAPSULE    Take 1 capsule (10 mg) by mouth once daily.    OMEPRAZOLE (PRILOSEC) 20 MG DR CAPSULE    Take 1 capsule (20 mg) by mouth once daily in the morning. Take before meals.    ONDANSETRON (ZOFRAN) 4 MG TABLET    Take 1 tablet (4 mg) by mouth every 4 hours.    POTASSIUM CHLORIDE CR (KLOR-CON M20) 20 MEQ ER TABLET    Take 1 tablet (20 mEq) by mouth 2 times a day. with or after meal    POTASSIUM CHLORIDE CR 10 MEQ ER TABLET    Take 1 tablet (10 mEq) by mouth once daily.   Modified Medications    No medications on file   Discontinued Medications    No medications on file      The patient’s home medications have been reviewed.    Allergies: Bee venom protein (honey bee), Lisinopril, Amlodipine, Fluticasone propion-salmeterol, and Losartan    -------------------------------------------------- RESULTS -------------------------------------------------  All laboratory and radiology results have been personally reviewed by myself   LABS:  Labs Reviewed   SEDIMENTATION RATE, AUTOMATED - Abnormal       Result Value    Sedimentation Rate 22 (*)    COMPREHENSIVE METABOLIC PANEL - Abnormal    Glucose 108 (*)     Sodium 135 (*)     Potassium 3.7      Chloride 103      Bicarbonate 25      Anion Gap 11      Urea Nitrogen 9      Creatinine 0.82      eGFR >90      Calcium 8.9      Albumin 4.0      Alkaline Phosphatase 84      Total Protein 6.6      AST 12      Bilirubin, Total 0.4      ALT 12     CBC WITH AUTO DIFFERENTIAL - Abnormal    WBC 9.8      nRBC 0.0      RBC 4.96      Hemoglobin 13.8      Hematocrit 42.3      MCV 85      MCH 27.8      MCHC 32.6      RDW 14.6 (*)     Platelets 305      Neutrophils % 71.2      Immature Granulocytes %, Automated 0.2      Lymphocytes % 16.9       Monocytes % 10.1      Eosinophils % 1.1      Basophils % 0.5      Neutrophils Absolute 6.99      Immature Granulocytes Absolute, Automated 0.02      Lymphocytes Absolute 1.66      Monocytes Absolute 0.99      Eosinophils Absolute 0.11      Basophils Absolute 0.05     SARS-COV-2 PCR, SYMPTOMATIC - Abnormal    Coronavirus 2019, PCR Detected (*)     Narrative:     This assay has received FDA Emergency Use Authorization (EUA) and is only authorized for the duration of time that circumstances exist to justify the authorization of the emergency use of in vitro diagnostic tests for the detection of SARS-CoV-2 virus and/or diagnosis of COVID-19 infection under section 564(b)(1) of the Act, 21 U.S.C. 360bbb-3(b)(1). This assay is an in vitro diagnostic nucleic acid amplification test for the qualitative detection of SARS-CoV-2 from nasopharyngeal specimens and has been validated for use at [facility]. Negative results do not preclude COVID-19 infections and should not be used as the sole basis for diagnosis, treatment, or other management decisions.     INFLUENZA A AND B PCR - Normal    Flu A Result Not Detected      Flu B Result Not Detected      Narrative:     This assay is an in vitro diagnostic multiplex nucleic acid amplification test for the detection and discrimination of Influenza A & B from nasopharyngeal specimens, and has been validated for use at [facility]. Negative results do not preclude Influenza A/B infections, and should not be used as the sole basis for diagnosis, treatment, or other management decisions. If Influenza A/B and RSV PCR results are negative, testing for Parainfluenza virus, Adenovirus and Metapneumovirus is routinely performed for [facility] pediatric oncology and intensive care inpatients, and is available on other patients by placing an add-on request.   B-TYPE NATRIURETIC PEPTIDE - Normal    BNP 44      Narrative:        <100 pg/mL - Heart failure  unlikely  100-299 pg/mL - Intermediate probability of acute heart                  failure exacerbation. Correlate with clinical                  context and patient history.    >=300 pg/mL - Heart Failure likely. Correlate with clinical                  context and patient history.    BNP testing is performed using different testing methodology at [facility] than at other [facility]. Direct result comparisons should only be made within the same method.      LIPASE - Normal    Lipase 10      Narrative:     Venipuncture immediately after or during the administration of Metamizole may lead to falsely low results. Testing should be performed immediately prior to Metamizole dosing.   LACTATE - Normal    Lactate 1.3      Narrative:     Venipuncture immediately after or during the administration of Metamizole may lead to falsely low results. Testing should be performed immediately  prior to Metamizole dosing.   D-DIMER, NON VTE - Normal    D-Dimer Non VTE, Quant (ng/mL FEU) 300      Narrative:     The D-Dimer assay is reported in ng/mL Fibrinogen Equivalent Units (FEU). The results of this assay should NOT be used for the exclusion of Deep Vein Thrombosis and/or Pulmonary Embolism.   URINALYSIS WITH REFLEX CULTURE AND MICROSCOPIC - Normal    Color, Urine Yellow      Appearance, Urine Clear      Specific Gravity, Urine 1.010      pH, Urine 5.0      Protein, Urine NEGATIVE      Glucose, Urine NEGATIVE      Blood, Urine NEGATIVE      Ketones, Urine NEGATIVE      Bilirubin, Urine NEGATIVE      Urobilinogen, Urine <2.0      Nitrite, Urine NEGATIVE      Leukocyte Esterase, Urine NEGATIVE     SERIAL TROPONIN-INITIAL - Normal    Troponin I, High Sensitivity <3      Narrative:     Less than 99th percentile of normal range cutoff-  Female and children under 18 years old <14 ng/L; Male <21 ng/L: Negative  Repeat testing should be performed if clinically indicated.     Female and children under 18 years old 14-50  ng/L; Male 21-50 ng/L:  Consistent with possible cardiac damage and possible increased clinical   risk. Serial measurements may help to assess extent of myocardial damage.     >50 ng/L: Consistent with cardiac damage, increased clinical risk and  myocardial infarction. Serial measurements may help assess extent of   myocardial damage.      NOTE: Children less than 1 year old may have higher baseline troponin   levels and results should be interpreted in conjunction with the overall   clinical context.     NOTE: Troponin I testing is performed using a different   testing methodology at [facility] than at other   [facility]. Direct result comparisons should only   be made within the same method.   BLOOD CULTURE   BLOOD CULTURE   BLOOD GAS VENOUS   URINALYSIS WITH REFLEX CULTURE AND MICROSCOPIC    Narrative:     The following orders were created for panel order Urinalysis with Reflex Culture and Microscopic.  Procedure                               Abnormality         Status                     ---------                               -----------         ------                     Urinalysis with Reflex C...[[id number]]  Normal              Final result               Extra Urine Gray Tube[[id number]]                            In process                   Please view results for these tests on the individual orders.   TROPONIN SERIES- (INITIAL, 1 HR)    Narrative:     The following orders were created for panel order Troponin Series, (0, 1 HR).  Procedure                               Abnormality         Status                     ---------                               -----------         ------                     Troponin I, High Sensiti...[[id number]]  Normal              Final result               Troponin, High Sensitivi...[[id number]]                                                   Please view results for these tests on the individual orders.   EXTRA URINE GRAY TUBE   SERIAL TROPONIN, 1 HOUR  "    EKG: Sinus rhythm 70 bpm, normal axis, normal intervals, no acute ST elevations.  Interpreted by ADARSH Connolly MD    RADIOLOGY:  Interpreted by Radiologist.  No orders to display       No results found for this or any previous visit (from the past 4464 hour(s)).  ------------------------- NURSING NOTES AND VITALS REVIEWED ---------------------------   The nursing notes within the ED encounter and vital signs as below have been reviewed.   /76   Pulse 67   Temp 36.2 °C (97.1 °F) (Temporal)   Resp 17   Ht 1.676 m (5' 6\")   Wt (!) 154 kg (340 lb)   SpO2 97%   BMI 54.88 kg/m²   Oxygen Saturation Interpretation: Normal      ---------------------------------------------------PHYSICAL EXAM--------------------------------------  Physical Exam   Constitutional/General: Alert and oriented x3, well appearing, non toxic in NAD  Head: Normocephalic and atraumatic  Eyes: PERRL, EOMI, conjunctiva normal, sclera non icteric  Mouth: Oropharynx clear, handling secretions, no trismus, no asymmetry of the posterior oropharynx or uvular edema  Neck: Supple, full ROM, non tender to palpation in the midline, no stridor, no crepitus, no meningeal signs  Respiratory: Lungs clear to auscultation bilaterally, no wheezes, rales, or rhonchi. Not in respiratory distress  Cardiovascular:  Regular rate. Regular rhythm. No murmurs, gallops, or rubs. 2+ distal pulses  Chest: No chest wall tenderness  GI:  Abdomen Soft, Non tender, Non distended.  +BS. No organomegaly, no palpable masses,  No rebound, guarding, or rigidity.   Musculoskeletal: Moves all extremities x 4. Warm and well perfused, no clubbing, cyanosis, or edema. Capillary refill <3 seconds  Integument: skin warm and dry. No rashes.   Lymphatic: no lymphadenopathy noted  Neurologic: GCS 15, no focal deficits, symmetric strength 5/5 in the upper and lower extremities bilaterally  Psychiatric: Normal Affect    Procedures    ------------------------------ ED COURSE/MEDICAL " DECISION MAKING----------------------    Medical Decision Making:   Patient's COVID swab is positive.  She has a cough and cold and GI symptoms along with body aches and pains.  Her EKG and troponin testing are normal.  D-dimer testing is negative.  Her CBC is normal.  Her pulse ox is 97% on room air.  A chest x-ray was not obtained.  She is stable for outpatient management.  Diagnoses as of 12/28/23 1506   COVID-19      Counseling:   The emergency provider has spoken with the patient and discussed today’s results, in addition to providing specific details for the plan of care and counseling regarding the diagnosis and prognosis.  Questions are answered at this time and they are agreeable with the plan.      --------------------------------- IMPRESSION AND DISPOSITION ---------------------------------        IMPRESSION  1. COVID-19        DISPOSITION  Disposition: Discharge to home  Patient condition is fair      Billing Provider Critical Care Time: 0 minutes     Cr Connolly MD  01/14/24 [id number]

## 2023-12-29 LAB
ATRIAL RATE: 70 BPM
HOLD SPECIMEN: NORMAL
P AXIS: 98 DEGREES
P OFFSET: 175 MS
P ONSET: 145 MS
PR INTERVAL: 158 MS
Q ONSET: 224 MS
QRS COUNT: 11 BEATS
QRS DURATION: 92 MS
QT INTERVAL: 392 MS
QTC CALCULATION(BAZETT): 423 MS
QTC FREDERICIA: 412 MS
R AXIS: -2 DEGREES
T AXIS: 20 DEGREES
T OFFSET: 420 MS
VENTRICULAR RATE: 70 BPM

## 2024-01-02 LAB
BACTERIA BLD CULT: NORMAL
BACTERIA BLD CULT: NORMAL

## 2024-01-08 ENCOUNTER — APPOINTMENT (OUTPATIENT)
Dept: HEMATOLOGY/ONCOLOGY | Facility: HOSPITAL | Age: 44
End: 2024-01-08
Payer: COMMERCIAL

## 2024-01-11 ENCOUNTER — TELEPHONE (OUTPATIENT)
Dept: HEMATOLOGY/ONCOLOGY | Facility: HOSPITAL | Age: 44
End: 2024-01-11
Payer: COMMERCIAL

## 2024-01-11 NOTE — TELEPHONE ENCOUNTER
Reached out to patient regarding the message she left yesterday. Patient had to cancel previous appointment due to having COVID in December. Patient called to rescheduled NPV. Scheduled patient for February 23rd, @ 9:00 am. Patient verbalized and agreed to appointment.

## 2024-01-26 DIAGNOSIS — J45.909 ASTHMA, UNSPECIFIED ASTHMA SEVERITY, UNSPECIFIED WHETHER COMPLICATED, UNSPECIFIED WHETHER PERSISTENT (HHS-HCC): ICD-10-CM

## 2024-01-26 DIAGNOSIS — K21.9 GERD WITHOUT ESOPHAGITIS: ICD-10-CM

## 2024-01-26 DIAGNOSIS — I10 PRIMARY HYPERTENSION: ICD-10-CM

## 2024-01-26 DIAGNOSIS — F32.A DEPRESSION, UNSPECIFIED DEPRESSION TYPE: ICD-10-CM

## 2024-01-26 RX ORDER — FLUOXETINE HYDROCHLORIDE 40 MG/1
40 CAPSULE ORAL DAILY
Qty: 90 CAPSULE | Refills: 1 | Status: SHIPPED | OUTPATIENT
Start: 2024-01-26

## 2024-01-26 RX ORDER — MONTELUKAST SODIUM 10 MG/1
10 TABLET ORAL NIGHTLY
Qty: 90 TABLET | Refills: 1 | Status: SHIPPED | OUTPATIENT
Start: 2024-01-26

## 2024-01-26 RX ORDER — OMEPRAZOLE 20 MG/1
20 CAPSULE, DELAYED RELEASE ORAL
Qty: 90 CAPSULE | Refills: 1 | Status: SHIPPED | OUTPATIENT
Start: 2024-01-26

## 2024-01-26 RX ORDER — LOSARTAN POTASSIUM 50 MG/1
50 TABLET ORAL DAILY
Qty: 90 TABLET | Refills: 1 | Status: SHIPPED | OUTPATIENT
Start: 2024-01-26

## 2024-01-31 ENCOUNTER — OFFICE VISIT (OUTPATIENT)
Dept: ORTHOPEDIC SURGERY | Facility: CLINIC | Age: 44
End: 2024-01-31
Payer: COMMERCIAL

## 2024-01-31 ENCOUNTER — HOSPITAL ENCOUNTER (OUTPATIENT)
Dept: RADIOLOGY | Facility: HOSPITAL | Age: 44
Discharge: HOME | End: 2024-01-31
Payer: COMMERCIAL

## 2024-01-31 VITALS — BODY MASS INDEX: 47.09 KG/M2 | WEIGHT: 293 LBS | HEIGHT: 66 IN

## 2024-01-31 DIAGNOSIS — M25.561 RIGHT KNEE PAIN, UNSPECIFIED CHRONICITY: ICD-10-CM

## 2024-01-31 DIAGNOSIS — M94.261 CHONDROMALACIA, KNEE, RIGHT: Primary | ICD-10-CM

## 2024-01-31 DIAGNOSIS — S83.241A ACUTE MEDIAL MENISCUS TEAR OF RIGHT KNEE, INITIAL ENCOUNTER: ICD-10-CM

## 2024-01-31 PROCEDURE — 73564 X-RAY EXAM KNEE 4 OR MORE: CPT | Mod: RIGHT SIDE | Performed by: RADIOLOGY

## 2024-01-31 PROCEDURE — 1036F TOBACCO NON-USER: CPT | Performed by: ORTHOPAEDIC SURGERY

## 2024-01-31 PROCEDURE — 99214 OFFICE O/P EST MOD 30 MIN: CPT | Performed by: ORTHOPAEDIC SURGERY

## 2024-01-31 PROCEDURE — 73564 X-RAY EXAM KNEE 4 OR MORE: CPT | Mod: RT

## 2024-01-31 ASSESSMENT — PAIN - FUNCTIONAL ASSESSMENT: PAIN_FUNCTIONAL_ASSESSMENT: 0-10

## 2024-02-01 NOTE — PROGRESS NOTES
43-year-old female with complaints of 10 months of right knee pain.  According the patient back in March 2023 she twisted on the right knee and started having right knee pain.  She describes it as a pain over the medial aspect of the knee worse with twisting and pivoting motions.  She describes it as an ache that she has all of the time.  She is already had 3 cortisone injections into the knee which temporarily relieve her pain but the pain returns again.  Her most recent steroid injection was on December 15, 2023.  She is also tried treating it with heat and ice.  She is been taking anti-inflammatories no improvement.  She states it is better with the steroid injections worthless with range of motion the knee    Patients' self reported past medical history, medications, allergies, surgical history, family and social history as well as a 10 point review of systems has been documented in the new patient intake form and scanned into the patient's electronic medical record.  The intake form was reviewed by Dr Rosa during the office visit and signed by Dr. Rosa and the patient.  Pertinent findings are documented in the HPI.    General Multi-System Physical Exam:  Constitutional  General appearance:  Alert, oriented, and in no acute distress.  Well developed, well nourished.  Head and Face  Head and face:  Normocephalic and atraumatic.  Ears, Nose, Mouth, and Throat  External inspection of ears and nose: Normal.  Eyes:  Pupils are equal and round.  Neck  Neck:  no neck mass was observed.  Pulmonary  Respiratory effort:  no respiratory distress.  Cardiovascular  Intact distal pulses.  Lymphatic  Palpation of lymph nodes in the affected extremity:  Normal.  Skin  Skin and subcutaneous tissue:  Normal skin color and pigmentation.  Normal skin turgor.  No rashes.  Neurologic  Sensation:  normal to light touch.  Psychiatric  Judgement and insight:  Intact.  Mood and affect:  Normal.  Musculoskeletal  Right knee has full  range of motion.  She is acutely tender over the medial joint line with a positive Prabha's no tenderness laterally.  No effusion in the knee.  Patient has a negative Lockman exam, negative anterior and negative posterior drawer. The knee is stable to varus and valgus stress without pain. Patient is neurovascularly intact in the bilateral lower extremities.      X-rays of the patient were ordered by Dr Rosa and obtained today.  Dr Rosa personally reviewed the results of the x-rays.    In addition, Dr Rosa independently interpreted the patient's x-rays (performed by the Radiology department) by viewing the x-ray images and this is Dr. Rosa's personal interpretation:     Right knee with mild arthritis    Dr Rosa independently interpreted the patient's MRI (performed by the Radiology department) by viewing the MRI images and this is Dr. Rosa's personal interpretation:     Right knee with significant medial meniscus tear flipped up into the gutter with some medial compartment cartilage damage    We had a very long discussion in regard to her right knee.  We talked about the fact this been going on for 10 months.  We talked about her meniscus tear and surgical versus conservative treatment options.  We discussed the fact that while she has been doing home exercises she has not tried a formal course of physical therapy.  We did discuss knee arthroscopy and partial meniscectomy at great length.  Due to the fact that the patient has has not yet had physical therapy we gave her prescription for 4 weeks of physical therapy to work on range of motion and strengthening.  If she still having pain after the 4 weeks of physical therapy I would want then want her to come back to see me and we would talk about right knee arthroscopy with partial meniscectomy and possible microfracture.  We also talked about weight loss which she has been working on and has been losing some weight.  Will see her back as  needed        This patient has had longstanding pain and weakness in their affected extremity which has gotten worse over the last few months.  Non-operative treatment has failed to help this patient and the pain is worsening.  That would classify this problem as a chronic illness with exacerbation and progression.    Due to this patient's condition, they are at a moderate risk of morbidity from additional diagnostic testing / treatment.      To help them with their pain, I wrote them a prescription for prescription strength anti-inflammatories.  The patient was informed that there are risks of using nonsteroidal antiinflammatory (NSAID) medications.    Risks of NSAIDS include, but are not limited to, upset stomach, ulcers in the stomach and other places in the gastrointestinal tract, and a mild increase in cardiovascular risk as a result of the antiinflammatory medications.  In addition, there is an increased risk in bleeding as a result of the medications.    The patient was advised to stop taking the NSAIDs if they cause them to have an upset stomach.  NSAIDs are not supposed to be taken every day for more than a few weeks.  If they have any questions or problems with the antiinflammatory medications, they should stop taking the medication immediately and call the office.

## 2024-02-06 PROBLEM — R93.89 ABNORMAL MAGNETIC RESONANCE IMAGING STUDY: Status: ACTIVE | Noted: 2023-03-30

## 2024-02-06 PROBLEM — S89.91XA UNSPECIFIED INJURY OF RIGHT LOWER LEG, INITIAL ENCOUNTER: Status: ACTIVE | Noted: 2023-03-30

## 2024-02-06 PROBLEM — J34.89 OTHER SPECIFIED DISORDERS OF NOSE AND NASAL SINUSES: Status: ACTIVE | Noted: 2023-01-11

## 2024-02-06 PROBLEM — M65.4 DE QUERVAIN'S TENOSYNOVITIS, RIGHT: Status: ACTIVE | Noted: 2024-02-06

## 2024-02-06 PROBLEM — S80.12XA: Status: ACTIVE | Noted: 2022-07-07

## 2024-02-06 PROBLEM — F17.200 NICOTINE DEPENDENCE, UNSPECIFIED, UNCOMPLICATED: Status: ACTIVE | Noted: 2023-02-16

## 2024-02-06 PROBLEM — M25.569 KNEE PAIN: Status: ACTIVE | Noted: 2023-03-30
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
## 2024-02-06 PROBLEM — Z20.822 CONTACT WITH AND (SUSPECTED) EXPOSURE TO COVID-19: Status: ACTIVE | Noted: 2023-02-16

## 2024-02-06 PROBLEM — S83.249A ACUTE MEDIAL MENISCAL TEAR: Status: ACTIVE | Noted: 2024-02-06

## 2024-02-06 PROBLEM — R09.81 NASAL CONGESTION: Status: ACTIVE | Noted: 2023-02-16

## 2024-02-06 PROBLEM — R49.0 DYSPHONIA: Status: ACTIVE | Noted: 2023-01-11

## 2024-02-06 PROBLEM — J20.9 ACUTE BRONCHITIS: Status: ACTIVE | Noted: 2023-02-16

## 2024-02-06 PROBLEM — S89.90XA KNEE INJURY: Status: ACTIVE | Noted: 2024-02-06

## 2024-02-06 PROBLEM — M25.512 PAIN IN LEFT SHOULDER: Status: ACTIVE | Noted: 2022-07-07

## 2024-02-06 PROBLEM — Z79.899 OTHER LONG TERM (CURRENT) DRUG THERAPY: Status: ACTIVE | Noted: 2023-02-16

## 2024-02-06 PROBLEM — J06.9 UPPER RESPIRATORY TRACT INFECTION: Status: ACTIVE | Noted: 2023-01-11

## 2024-02-06 PROBLEM — S49.92XA ARM INJURY, LEFT, INITIAL ENCOUNTER: Status: ACTIVE | Noted: 2024-02-06

## 2024-02-06 PROBLEM — M79.605 PAIN IN LEFT LEG: Status: ACTIVE | Noted: 2022-07-07

## 2024-02-06 PROBLEM — R05.9 COUGH, UNSPECIFIED: Status: ACTIVE | Noted: 2023-02-16

## 2024-02-12 DIAGNOSIS — S83.241A ACUTE MEDIAL MENISCUS TEAR OF RIGHT KNEE, INITIAL ENCOUNTER: ICD-10-CM

## 2024-02-12 RX ORDER — NAPROXEN 500 MG/1
500 TABLET ORAL 2 TIMES DAILY PRN
Qty: 60 TABLET | Refills: 0 | Status: SHIPPED | OUTPATIENT
Start: 2024-02-12 | End: 2024-03-13

## 2024-02-21 NOTE — PROGRESS NOTES
"[facility]    PATIENT VISIT INFORMATION    Visit Type: New Visit    Referring Provider: GRISELDA Vitale-CNP  Reason for referral: Abnormal MRI of Knee    HEMATOLOGY HISTORY    Patient has a unremarkable history of nutritional deficiencies.  Sent for a referral abnormal MRI of the knee.  She has a history of 10 months of knee pain but these.  Following with SYMONE Rosa MD.  Surgery planned soon awaiting scheduling.  She has had multiple corticosteroid injections with temporary relief and the pain returns.  Last steroid injection December 15, 2023.  She uses multiple nonpharmacological interventions as well as anti-inflammatories with no improvement.  She is currently on extra strength anti-inflammatory prescription.  She has concern for right meniscus tear.  -She has history of iron deficiency anemia when she had heavy manses though has since had a hysterectomy 2019.  -High white count previous seen by Hematology 6-8 years ago (\"immature or good white count high\")     Most recent imaging:  MR knee right wo IV contrast 11/25/2023  IMPRESSION:  1.  Complex predominantly radial tear of the body of the medial  meniscus with extension to the posterior horn.  2. Mild cartilage loss in the medial and patellofemoral compartments.  3. Small effusion  4. Hematopoietic marrow reconversion in the distal femur and proximal  tibia, a nonspecific finding which can be seen with hematologic  disorders, anemia and smoking among other etiologies  XR knee right 4+ views 1/31/2024  FINDINGS:  No acute fracture or malalignment.  Mild medial compartment osteoarthrosis with mild joint space loss.  No significant knee joint effusion.  Soft tissues are unremarkable.  IMPRESSION:  1. Mild medial compartment osteoarthrosis.  CT NECK SOFT TISSUE WITH CONTRAST 8/28/2021  DISCUSSION:  Comparison is made with a CT scan of 10/15/2010.   There is a mildly hyperdense nodule in the anterior aspect of " the   right parotid gland measuring 13 x 10 mm. There are no other   enhancing masses or cystic lesions throughout the neck. The   parapharyngeal fat planes are preserved. There are no areas of   inflammatory stranding or induration throughout the neck. The   submandibular glands are symmetric in size and homogeneous.   There is no thickening of the epiglottis or aryepiglottic folds.   There are no pathologically enlarged right or left cervical lymph   nodes. The thyroid gland is relatively homogeneous.  The paranasal sinuses are clear. The right and left mastoid air cells   and middle ear cavities are clear. The imaged lung apices are clear.     HISTORY OF PRESENT ILLNESS     ID Statement: Rhonda Pizano is a 43 year old female    Chief Complaint: Abnormal MRI of the knee    Interval History:   Rhonda presents with her  for initial evaluation of abnormal right knee MRI.  Patient is unsure why she was sent to hematology other than the above concerning.  She reports fatigue, seasonal allergies with rhinorrhea, LISE, mild back discomfort, palpitations at night when she sleeps though he reports multiple different doctors had told her this is of no concern.  Multiple aches and pains in her joints especially bilateral knees for many years.  States stable weight.  Eats healthy especially can.  She denies fevers, drenching night sweats, illnesses or infections, difficulty sleeping other than LISE on CPAP, no bone pain other than joint pain stated above, shortness of breath, chest pain, abdominal pain, nausea, vomiting, diarrhea, constipation, blood in stool.  No urinary concerns or symptoms, no neuropathy, weight loss, or other concern at this time.  -Surgery on knee Dr Rosa to be scheduled.   -Positive for COVID 12/28/2023 seen in the emergency room for weakness, chills, and fever.  Recovered outpatient.  No other recent hospitalizations or emergency room visits.  PAST/CURRENT HISTORY     MEDICAL/SURGICAL  "HISTORY  -JEANETTE in past r/t heavy vaginal bleeding  -High white count previous seen by Hematology 6-8 years ago (\"immature or good white count high\")   -Torn meniscus, right  -HTN  -LISE on CPAP  -Lactose intolerance   -Arthritis knee/osteoarthritis  -Hysterectomy 2019    SOCIAL HISTORY  -Live Arrangement: Lives with  Antonino and Mom   -Work place: Developmental disabled adults    -Tobacco/smokeless use: Never smoker   -Alcohol: denies   -Illicit drug or marijuana use: Denies    -Samaritan or Spiritual beliefs: none reported   -Social Determinates of Health Concerns: none reported    FAMILY HISTORY  -Mom RA, osteoarthritis, dementia (slow moving onset vascular dementia) Pain pill addiction,   -Dad Lung cancer smoker, metastases   -Maternal Grandparent (Grandfather brain cancer, Grandmother colon and breast)  -Paternal Grandparent (Grandmother a lot of cancer types)  -No other known history of hematologic, bleeding, clotting, autoimmune, genetic, or malignant disorders in the family.     OCCUPATIONAL/ENVIRONMENTAL HISTORY/EXPOSURES:  -None reported    Active Problems, Allergy List, Medication List, and PMH/PSH/FH/Social Hx have been reviewed and reconciled in chart. Updates made when necessary.     REVIEW OF SYSTEMS   A review of systems has been completed and are negative for complaints except what is stated in the assessment, HPI, IH, ROS, and/or past medical history.  Review of Systems   Constitutional:  Positive for fatigue.   HENT:  Positive for rhinorrhea and sneezing.         Seasonal    Eyes: Negative.    Respiratory: Negative.     Cardiovascular:  Positive for palpitations (flutters at night, she has been told by 7 different doctorsthat it is no concern).   Gastrointestinal: Negative.    Endocrine: Negative.    Genitourinary: Negative.    Musculoskeletal:  Positive for joint swelling.        Mild back discomfort    Allergic/Immunologic: Positive for environmental allergies.   Neurological: Negative.  "   Hematological: Negative.    Psychiatric/Behavioral: Negative.        ALLERGIES AND MEDICATIONS     Allergies and Intolerances:   Allergies   Allergen Reactions    Bee Venom Protein (Honey Bee) Anaphylaxis    Lisinopril Other and Cough     Reaction: Low BP    Amlodipine Unknown    Fluticasone Propion-Salmeterol Unknown    Losartan Wheezing      Medication Profile:   Current Outpatient Medications   Medication Instructions    acetaminophen (TylenoL) 325 mg tablet     albuterol 90 mcg/actuation inhaler inhalation, 4 times daily    amLODIPine (Norvasc) 5 mg tablet     azithromycin (Zithromax Z-Loc) 250 mg tablet oral, 3 times daily    cholecalciferol (Vitamin D-3) 5,000 Units tablet 1 tablet, oral, Daily    cholecalciferol (VITAMIN D-3) 1,000 Units, oral, Daily    cholecalciferol (VITAMIN D-3) 50 mcg, oral, Daily    cyclobenzaprine (FLEXERIL) 10 mg, oral, 3 times daily PRN    doxycycline (VIBRA-TABS) 100 mg, oral, 2 times daily    DULoxetine (Cymbalta) 60 mg DR capsule 1 capsule, oral, Daily    DULoxetine (CYMBALTA) 20 mg, oral, Daily RT    EPINEPHrine 0.3 mg/0.3 mL injection syringe INJECT THE CONTENTS OF ONE SYRINGE one TIME AS NEEDED FOR ALLERGIC REACTION.    FLUoxetine (PROzac) 20 mg capsule 1 capsule, oral, Daily    FLUoxetine (PROZAC) 40 mg, oral, Daily    fluticasone (Flonase Sensimist) 27.5 mcg/actuation nasal spray 2 sprays, Each Nostril, Daily RT    fluticasone furoate-vilanteroL (Breo Ellipta) 100-25 mcg/dose inhaler 1-2 puffs, inhalation, 2 times daily, RINSE MOUTH AFTER USE    guaiFENesin (Robitussin) 100 mg/5 mL syrup 10 mL, Every 4 hours PRN    hydroCHLOROthiazide (HYDRODiuril) 25 mg tablet 0.5-1 tablets, oral, Daily    hydroCHLOROthiazide (HYDRODIURIL) 12.5 mg, oral, Daily    ibuprofen 600 mg, oral, Every 6 hours PRN    ipratropium-albuteroL (Duo-Neb) 0.5-2.5 mg/3 mL nebulizer solution 3 mL, nebulization, Every 6 hours PRN    loratadine (Claritin) 10 mg tablet 1 tablet, oral, Daily     "loratadine-pseudoephedrine (Claritin-D 12-hour) 5-120 mg 12 hr tablet 1 tablet, oral, 2 times daily, Do not crush, chew, or split.     losartan (COZAAR) 50 mg, oral, Daily    meloxicam (Mobic) 15 mg tablet 1 tablet, oral, Daily, With food    montelukast (SINGULAIR) 10 mg, oral, Nightly    naproxen (NAPROSYN) 500 mg, oral, 2 times daily PRN    omeprazole (PRILOSEC) 10 mg, oral, Daily RT    omeprazole (PRILOSEC) 20 mg, oral, Daily before breakfast    ondansetron (Zofran) 4 mg tablet 1 tablet, oral, Every 4 hours    potassium chloride CR (Klor-Con M20) 20 mEq ER tablet 1 tablet, oral, 2 times daily, with or after meal    potassium chloride CR 10 mEq ER tablet 10 mEq, oral, Daily      Available Vaccination Record:   Immunization History   Administered Date(s) Administered    Flu vaccine (IIV4), preservative free *Check age/dose* 11/11/2015, 11/18/2016, 10/24/2022    Flu vaccine, quadrivalent, no egg protein, age 6 month or greater (FLUCELVAX) 10/27/2021    Flu vaccine, quadrivalent, recombinant, preservative free, adult (FLUBLOK) 10/01/2018, 10/10/2019    Influenza, seasonal, injectable 11/10/2005, 10/25/2007, 10/04/2010, 10/09/2019, 10/17/2020    Influenza, seasonal, injectable, preservative free 11/04/2013, 11/11/2015    Moderna SARS-CoV-2 Vaccination 02/16/2021, 03/19/2021    Tdap vaccine, age 7 year and older (BOOSTRIX, ADACEL) 06/23/2014      PHYSICAL EXAM     Vital Signs/Measurements:   Vitals:      11/25/2023    11:27 AM 11/30/2023    11:17 AM 12/28/2023     1:24 PM 12/28/2023     2:03 PM 12/28/2023     3:18 PM 1/31/2024     3:20 PM 2/23/2024     9:02 AM   Vitals   Systolic  140 198 167 155  174   Diastolic  80 119 76 85  106   Heart Rate  94 84 67 62  84   Temp  36.7 °C (98.1 °F) 36.2 °C (97.1 °F)    36.3 °C (97.4 °F)   Resp   17    18   Height (in) 1.676 m (5' 6\")  1.676 m (5' 6\")   1.676 m (5' 6\")    Weight (lb) 340 341 340   340 350   BMI 54.88 kg/m2 55.04 kg/m2 54.88 kg/m2   54.88 kg/m2 56.49 kg/m2   BSA " (m2) 2.68 m2 2.69 m2 2.68 m2   2.68 m2 2.72 m2   Visit Report  Report    Report Report      Performance:   ECOG Performance Status: 0     Grade ECOG performance status   0 Fully active, able to carry on all pre-disease performance without restriction   1 Restricted in physically strenuous activity but ambulatory and able to carry out work of a light or sedentary nature, e.g., light housework, office work   2 Ambulatory and capable of all selfcare but unable to carry out any work activities; Up and about more than 50% of waking hours   3 Capable of only limited selfcare, confined to bed or chair more than 50% of waking hours   4 Completely disabled; Cannot carry out any selfcare; Totally confined to bed or chair   5 Dead     Physical Exam:  General: Patient is awake/alert/oriented x3, no distress, Nourished, hydrated, alert and cooperative, ambulating without difficulty  Skin: Expected color for ethnicity, good turgor, dry, no prominent lesions, rashes, unusual bruising, or bleeding   Hair: Normal texture and distribution   Nails: Normal color, no deformities    HEENT:   Head: Normocephalic, atraumatic, no visible or palpable masses, depressions, or scarring   Eyes: Visual acuity intact, conjunctiva clear, sclera non-icteric, PERRL, EOMI, no exudates or hemorrhages   Ears: nl appearance, hearing intact    Nose: no external lesions, mucosa non-inflamed, no rhinorrhea   Mouth: Mucous membranes moist, no lesions, sores, bleeding, or erythema     Head/Neck: Neck supple, no apparent injury, thyroid without mass or tenderness, No JVD, trachea midline, no bruits appreciated    Respiratory/Thorax: Patent airways, CTAB, chest symmetry, normal inspiratory and expiratory effort    Cardiovascular: Regular rate and rhythm, no murmur or gallop, no carotid bruit or thrills    Gastrointestinal: Bowel sounds normal, non-distended, soft, no tenderness, no masses or hernia, or organomegaly appreciated    Genitourinary: deferred    Musculoskeletal: Normal gait, normal range of motion, no pain on palpation of spine, no deformity, normal strength for baseline, no atrophy, and no CVA tenderness appreciated   Extremities: No amputations or deformities, cyanosis, edema or viscosities, peripheral pulses intact   Neurological: CN 2-12 normal. Sensation present to touch, intact senses, motor response and reflexes normal, normal strength   Breast:    Lymphatic: No significant lymphadenopathy   Psychological: A/O X3, intact recent and remote memory, judgement, and insight. Appropriate mood, affect, and behavior        RESULTS/DATA     Labs:     Lab Results   Component Value Date    WBC 9.5 02/23/2024    NEUTROABS 6.72 02/23/2024    IGABSOL 0.04 02/23/2024    LYMPHSABS 1.97 02/23/2024    MONOSABS 0.59 02/23/2024    EOSABS 0.10 02/23/2024    BASOSABS 0.09 02/23/2024    RBC 5.20 02/23/2024    MCV 86 02/23/2024    MCHC 31.4 (L) 02/23/2024    HGB 14.1 02/23/2024    HCT 44.9 02/23/2024     02/23/2024     Lab Results   Component Value Date    RETICCTPCT 2.6 (H) 02/23/2024      Lab Results   Component Value Date    CREATININE 0.75 02/23/2024    BUN 14 02/23/2024    EGFR >90 02/23/2024     02/23/2024    K 4.3 02/23/2024     02/23/2024    CO2 27 02/23/2024      Lab Results   Component Value Date    ALT 16 02/23/2024    AST 17 02/23/2024    ALKPHOS 81 02/23/2024    BILITOT 0.5 02/23/2024      Lab Results   Component Value Date    TSH 1.03 05/04/2023     Lab Results   Component Value Date    TSH 1.03 05/04/2023     Lab Results   Component Value Date    IRON 63 02/23/2024    TIBC 388 02/23/2024    FERRITIN 117 02/23/2024      Lab Results   Component Value Date    ICANHIYH58 238 02/23/2024      Lab Results   Component Value Date    FOLATE 8.4 02/23/2024     Lab Results   Component Value Date    SEDRATE 22 (H) 12/28/2023     Lab Results   Component Value Date     02/23/2024     Lab Results   Component Value Date    HAPTOGLOBIN 195  02/23/2024     Radiology/Studies:   Please see above  ASSESSMENT/PLAN     Assessment and Plan:   #1.  Abnormal MRI of the knee  MRI performed on 11/25/2023 shows hematopoietic marrow reconversion in the distal femur and proximal tibia, a nonspecific finding which can be seen with hematologic disorders, anemia and smoking among other etiologies.  The patient never has been a smoker and she has hide iron deficiency anemia in the past.  Currently her labs and anemia workup are unknown remarkable.  Follow with surgeon.  Continue follow-up with primary practice provider.  Return as needed.    I have reviewed the patient's medical record including provider notes, laboratory and testing results, imaging, and procedures available within the system and outside the system pertinent.     Follow up:    RTC:  -Call with results   -Follow-up visit as needed    Medications:  -No medication prescribed     Imaging/Testing:  -No imagining      Referral:  -No referral made today     Other Pertinent Appointments:  -Ortho 2/28/2024    Patient Discussion Summary:    Thank you for allowing me to participate in your care. It was a pleasure meeting you.    Sincerely,  Albina Mackenzie, APRN-CNP       This document may have been written by voice recognition software.   Time based billing: Please see documentation within the specific encounter.

## 2024-02-23 ENCOUNTER — LAB (OUTPATIENT)
Dept: LAB | Facility: LAB | Age: 44
End: 2024-02-23
Payer: COMMERCIAL

## 2024-02-23 ENCOUNTER — OFFICE VISIT (OUTPATIENT)
Dept: HEMATOLOGY/ONCOLOGY | Facility: HOSPITAL | Age: 44
End: 2024-02-23
Payer: COMMERCIAL

## 2024-02-23 VITALS
BODY MASS INDEX: 56.49 KG/M2 | DIASTOLIC BLOOD PRESSURE: 106 MMHG | RESPIRATION RATE: 18 BRPM | OXYGEN SATURATION: 96 % | TEMPERATURE: 97.4 F | SYSTOLIC BLOOD PRESSURE: 174 MMHG | WEIGHT: 293 LBS | HEART RATE: 84 BPM

## 2024-02-23 DIAGNOSIS — Z86.39 HISTORY OF IRON DEFICIENCY: ICD-10-CM

## 2024-02-23 DIAGNOSIS — R93.6 ABNORMAL MRI, KNEE: ICD-10-CM

## 2024-02-23 DIAGNOSIS — Z86.39 HISTORY OF IRON DEFICIENCY: Primary | ICD-10-CM

## 2024-02-23 LAB
ALBUMIN SERPL BCP-MCNC: 4 G/DL (ref 3.4–5)
ALP SERPL-CCNC: 81 U/L (ref 33–110)
ALT SERPL W P-5'-P-CCNC: 16 U/L (ref 7–45)
ANION GAP SERPL CALC-SCNC: 10 MMOL/L (ref 10–20)
AST SERPL W P-5'-P-CCNC: 17 U/L (ref 9–39)
BASOPHILS # BLD AUTO: 0.09 X10*3/UL (ref 0–0.1)
BASOPHILS NFR BLD AUTO: 0.9 %
BILIRUB SERPL-MCNC: 0.5 MG/DL (ref 0–1.2)
BUN SERPL-MCNC: 14 MG/DL (ref 6–23)
CALCIUM SERPL-MCNC: 9.1 MG/DL (ref 8.6–10.3)
CHLORIDE SERPL-SCNC: 104 MMOL/L (ref 98–107)
CO2 SERPL-SCNC: 27 MMOL/L (ref 21–32)
CREAT SERPL-MCNC: 0.75 MG/DL (ref 0.5–1.05)
EGFRCR SERPLBLD CKD-EPI 2021: >90 ML/MIN/1.73M*2
EOSINOPHIL # BLD AUTO: 0.1 X10*3/UL (ref 0–0.7)
EOSINOPHIL NFR BLD AUTO: 1.1 %
ERYTHROCYTE [DISTWIDTH] IN BLOOD BY AUTOMATED COUNT: 14.1 % (ref 11.5–14.5)
FERRITIN SERPL-MCNC: 117 NG/ML (ref 8–150)
GLUCOSE SERPL-MCNC: 97 MG/DL (ref 74–99)
HCT VFR BLD AUTO: 44.9 % (ref 36–46)
HGB BLD-MCNC: 14.1 G/DL (ref 12–16)
HGB RETIC QN: 33 PG (ref 28–38)
IMM GRANULOCYTES # BLD AUTO: 0.04 X10*3/UL (ref 0–0.7)
IMM GRANULOCYTES NFR BLD AUTO: 0.4 % (ref 0–0.9)
IMMATURE RETIC FRACTION: 12.7 %
IRON SATN MFR SERPL: 16 % (ref 25–45)
IRON SERPL-MCNC: 63 UG/DL (ref 35–150)
LDH SERPL L TO P-CCNC: 185 U/L (ref 84–246)
LYMPHOCYTES # BLD AUTO: 1.97 X10*3/UL (ref 1.2–4.8)
LYMPHOCYTES NFR BLD AUTO: 20.7 %
MCH RBC QN AUTO: 27.1 PG (ref 26–34)
MCHC RBC AUTO-ENTMCNC: 31.4 G/DL (ref 32–36)
MCV RBC AUTO: 86 FL (ref 80–100)
MONOCYTES # BLD AUTO: 0.59 X10*3/UL (ref 0.1–1)
MONOCYTES NFR BLD AUTO: 6.2 %
NEUTROPHILS # BLD AUTO: 6.72 X10*3/UL (ref 1.2–7.7)
NEUTROPHILS NFR BLD AUTO: 70.7 %
NRBC BLD-RTO: 0 /100 WBCS (ref 0–0)
PLATELET # BLD AUTO: 311 X10*3/UL (ref 150–450)
POTASSIUM SERPL-SCNC: 4.3 MMOL/L (ref 3.5–5.3)
PROT SERPL-MCNC: 6.6 G/DL (ref 6.4–8.2)
RBC # BLD AUTO: 5.2 X10*6/UL (ref 4–5.2)
RETICS #: 0.14 X10*6/UL (ref 0.02–0.08)
RETICS/RBC NFR AUTO: 2.6 % (ref 0.5–2)
SODIUM SERPL-SCNC: 137 MMOL/L (ref 136–145)
TIBC SERPL-MCNC: 388 UG/DL (ref 240–445)
UIBC SERPL-MCNC: 325 UG/DL (ref 110–370)
WBC # BLD AUTO: 9.5 X10*3/UL (ref 4.4–11.3)

## 2024-02-23 PROCEDURE — 85025 COMPLETE CBC W/AUTO DIFF WBC: CPT

## 2024-02-23 PROCEDURE — 82728 ASSAY OF FERRITIN: CPT

## 2024-02-23 PROCEDURE — 83550 IRON BINDING TEST: CPT

## 2024-02-23 PROCEDURE — 99205 OFFICE O/P NEW HI 60 MIN: CPT

## 2024-02-23 PROCEDURE — 83010 ASSAY OF HAPTOGLOBIN QUANT: CPT

## 2024-02-23 PROCEDURE — 82607 VITAMIN B-12: CPT

## 2024-02-23 PROCEDURE — 3080F DIAST BP >= 90 MM HG: CPT

## 2024-02-23 PROCEDURE — 80053 COMPREHEN METABOLIC PANEL: CPT

## 2024-02-23 PROCEDURE — 82746 ASSAY OF FOLIC ACID SERUM: CPT

## 2024-02-23 PROCEDURE — 99215 OFFICE O/P EST HI 40 MIN: CPT

## 2024-02-23 PROCEDURE — 85045 AUTOMATED RETICULOCYTE COUNT: CPT

## 2024-02-23 PROCEDURE — 83540 ASSAY OF IRON: CPT

## 2024-02-23 PROCEDURE — 83615 LACTATE (LD) (LDH) ENZYME: CPT

## 2024-02-23 PROCEDURE — 3077F SYST BP >= 140 MM HG: CPT

## 2024-02-23 PROCEDURE — 36415 COLL VENOUS BLD VENIPUNCTURE: CPT

## 2024-02-23 PROCEDURE — 1036F TOBACCO NON-USER: CPT

## 2024-02-23 SDOH — ECONOMIC STABILITY: FOOD INSECURITY: WITHIN THE PAST 12 MONTHS, YOU WORRIED THAT YOUR FOOD WOULD RUN OUT BEFORE YOU GOT MONEY TO BUY MORE.: NEVER TRUE

## 2024-02-23 SDOH — ECONOMIC STABILITY: FOOD INSECURITY: WITHIN THE PAST 12 MONTHS, THE FOOD YOU BOUGHT JUST DIDN'T LAST AND YOU DIDN'T HAVE MONEY TO GET MORE.: NEVER TRUE

## 2024-02-23 SDOH — HEALTH STABILITY: PHYSICAL HEALTH: ON AVERAGE, HOW MANY DAYS PER WEEK DO YOU ENGAGE IN MODERATE TO STRENUOUS EXERCISE (LIKE A BRISK WALK)?: 0 DAYS

## 2024-02-23 SDOH — ECONOMIC STABILITY: INCOME INSECURITY: IN THE LAST 12 MONTHS, WAS THERE A TIME WHEN YOU WERE NOT ABLE TO PAY THE MORTGAGE OR RENT ON TIME?: NO

## 2024-02-23 SDOH — ECONOMIC STABILITY: GENERAL
WHICH OF THE FOLLOWING WOULD YOU LIKE TO GET CONNECTED TO IN ORDER TO RECEIVE A DISCOUNT OR FOR FREE? (CHOOSE ALL THAT APPLY): NONE OF THESE

## 2024-02-23 SDOH — ECONOMIC STABILITY: HOUSING INSECURITY
IN THE LAST 12 MONTHS, WAS THERE A TIME WHEN YOU DID NOT HAVE A STEADY PLACE TO SLEEP OR SLEPT IN A SHELTER (INCLUDING NOW)?: NO

## 2024-02-23 SDOH — HEALTH STABILITY: PHYSICAL HEALTH: ON AVERAGE, HOW MANY MINUTES DO YOU ENGAGE IN EXERCISE AT THIS LEVEL?: 0 MIN

## 2024-02-23 SDOH — ECONOMIC STABILITY: GENERAL
WHICH OF THE FOLLOWING DO YOU KNOW HOW TO USE AND HAVE ACCESS TO EVERY DAY? (CHOOSE ALL THAT APPLY): SMARTPHONE WITH CELLULAR DATA PLAN;DESKTOP COMPUTER, LAPTOP COMPUTER, OR TABLET WITH BROADBAND INTERNET CONNECTION

## 2024-02-23 SDOH — ECONOMIC STABILITY: TRANSPORTATION INSECURITY
IN THE PAST 12 MONTHS, HAS LACK OF TRANSPORTATION KEPT YOU FROM MEETINGS, WORK, OR FROM GETTING THINGS NEEDED FOR DAILY LIVING?: NO

## 2024-02-23 SDOH — ECONOMIC STABILITY: TRANSPORTATION INSECURITY
IN THE PAST 12 MONTHS, HAS THE LACK OF TRANSPORTATION KEPT YOU FROM MEDICAL APPOINTMENTS OR FROM GETTING MEDICATIONS?: NO

## 2024-02-23 SDOH — ECONOMIC STABILITY: HOUSING INSECURITY: IN THE LAST 12 MONTHS, HOW MANY PLACES HAVE YOU LIVED?: 1

## 2024-02-23 ASSESSMENT — LIFESTYLE VARIABLES
HOW MANY STANDARD DRINKS CONTAINING ALCOHOL DO YOU HAVE ON A TYPICAL DAY: PATIENT DOES NOT DRINK
HOW OFTEN DO YOU HAVE SIX OR MORE DRINKS ON ONE OCCASION: NEVER
AUDIT-C TOTAL SCORE: 0
SKIP TO QUESTIONS 9-10: 1
HOW OFTEN DO YOU HAVE A DRINK CONTAINING ALCOHOL: NEVER

## 2024-02-23 ASSESSMENT — SOCIAL DETERMINANTS OF HEALTH (SDOH)
WITHIN THE LAST YEAR, HAVE YOU BEEN KICKED, HIT, SLAPPED, OR OTHERWISE PHYSICALLY HURT BY YOUR PARTNER OR EX-PARTNER?: NO
IN A TYPICAL WEEK, HOW MANY TIMES DO YOU TALK ON THE PHONE WITH FAMILY, FRIENDS, OR NEIGHBORS?: MORE THAN THREE TIMES A WEEK
HOW OFTEN DO YOU GET TOGETHER WITH FRIENDS OR RELATIVES?: ONCE A WEEK
DO YOU BELONG TO ANY CLUBS OR ORGANIZATIONS SUCH AS CHURCH GROUPS UNIONS, FRATERNAL OR ATHLETIC GROUPS, OR SCHOOL GROUPS?: NO
WITHIN THE LAST YEAR, HAVE YOU BEEN HUMILIATED OR EMOTIONALLY ABUSED IN OTHER WAYS BY YOUR PARTNER OR EX-PARTNER?: NO
HOW OFTEN DO YOU ATTEND CHURCH OR RELIGIOUS SERVICES?: 1 TO 4 TIMES PER YEAR
WITHIN THE LAST YEAR, HAVE YOU BEEN AFRAID OF YOUR PARTNER OR EX-PARTNER?: NO
HOW HARD IS IT FOR YOU TO PAY FOR THE VERY BASICS LIKE FOOD, HOUSING, MEDICAL CARE, AND HEATING?: NOT VERY HARD
WITHIN THE LAST YEAR, HAVE TO BEEN RAPED OR FORCED TO HAVE ANY KIND OF SEXUAL ACTIVITY BY YOUR PARTNER OR EX-PARTNER?: NO
HOW OFTEN DO YOU ATTENT MEETINGS OF THE CLUB OR ORGANIZATION YOU BELONG TO?: NEVER
IN THE PAST 12 MONTHS, HAS THE ELECTRIC, GAS, OIL, OR WATER COMPANY THREATENED TO SHUT OFF SERVICE IN YOUR HOME?: NO

## 2024-02-23 ASSESSMENT — ENCOUNTER SYMPTOMS
GASTROINTESTINAL NEGATIVE: 1
PALPITATIONS: 1
NEUROLOGICAL NEGATIVE: 1
RESPIRATORY NEGATIVE: 1
PSYCHIATRIC NEGATIVE: 1
HEMATOLOGIC/LYMPHATIC NEGATIVE: 1
RHINORRHEA: 1
JOINT SWELLING: 1
FATIGUE: 1
ENDOCRINE NEGATIVE: 1
EYES NEGATIVE: 1

## 2024-02-23 ASSESSMENT — PAIN SCALES - GENERAL: PAINLEVEL: 4

## 2024-02-24 LAB
FOLATE SERPL-MCNC: 8.4 NG/ML
HAPTOGLOB SERPL-MCNC: 195 MG/DL (ref 37–246)
VIT B12 SERPL-MCNC: 238 PG/ML (ref 211–911)

## 2024-02-28 ENCOUNTER — OFFICE VISIT (OUTPATIENT)
Dept: ORTHOPEDIC SURGERY | Facility: CLINIC | Age: 44
End: 2024-02-28
Payer: COMMERCIAL

## 2024-02-28 DIAGNOSIS — M94.261 CHONDROMALACIA, KNEE, RIGHT: ICD-10-CM

## 2024-02-28 DIAGNOSIS — S83.241A ACUTE MEDIAL MENISCUS TEAR OF RIGHT KNEE, INITIAL ENCOUNTER: Primary | ICD-10-CM

## 2024-02-28 PROCEDURE — 99215 OFFICE O/P EST HI 40 MIN: CPT | Performed by: ORTHOPAEDIC SURGERY

## 2024-02-28 PROCEDURE — 1036F TOBACCO NON-USER: CPT | Performed by: ORTHOPAEDIC SURGERY

## 2024-02-28 NOTE — PROGRESS NOTES
42-year-old female with continued significant pain in the medial aspect of her right knee.  She is now done physical therapy multiple sessions and continues having significant medial knee pain right knee.    Patients' self reported past medical history, medications, allergies, surgical history, family and social history as well as a 10 point review of systems has been documented in the new patient intake form and scanned into the patient's electronic medical record.  The intake form was reviewed by Dr Rosa during the office visit and signed by Dr. Rosa and the patient.  Pertinent findings are documented in the HPI.    General Multi-System Physical Exam:  Constitutional  General appearance:  Alert, oriented, and in no acute distress.  Well developed, well nourished.  Head and Face  Head and face:  Normocephalic and atraumatic.  Ears, Nose, Mouth, and Throat  External inspection of ears and nose: Normal.  Eyes:  Pupils are equal and round.  Neck  Neck:  no neck mass was observed.  Pulmonary  Respiratory effort:  no respiratory distress.  Cardiovascular  Intact distal pulses.  Lymphatic  Palpation of lymph nodes in the affected extremity:  Normal.  Skin  Skin and subcutaneous tissue:  Normal skin color and pigmentation.  Normal skin turgor.  No rashes.  Neurologic  Sensation:  normal to light touch.  Psychiatric  Judgement and insight:  Intact.  Mood and affect:  Normal.  Musculoskeletal  Right knee is acutely tender over the medial joint line with positive Prabha's no tenderness laterally.  Full range of motion the knee.  Patient has a negative Lockman exam, negative anterior and negative posterior drawer. The knee is stable to varus and valgus stress without pain. Patient is neurovascularly intact in the bilateral lower extremities.      Dr Rosa independently interpreted the patient's MRI (performed by the Radiology department) by viewing the MRI images and this is Dr. Rosa's personal interpretation:      Right knee with medial meniscus tear flipped in the gutter    The patient is already failed extensive conservative treatment with anti-inflammatories physical therapy and steroid injections.  She continues having significant pain with no improvement.  At this point she wants to proceed with right knee arthroscopy with partial meniscectomy and possible microfracture on April 4        I, Dr. Rosa, had a long discussion with the patient / patient's family regarding the risks versus benefits of surgery and all of the treatment options, including nonoperative treatment and surgical treatment options. We discussed the risks of surgery.      The risks of surgery include, but are not limited to, nerve damage, artery damage, muscle damage, risk of bleeding or infection, risk of bone fracture, risk of post-operative stiffness, risk of failure of the surgery with possible continued pain or possible need for additional surgery.  Other risks include the risk of hardware pain and possible need for removal of the surgical hardware at another time.   The risks of undergoing anesthesia including, but are not limited to, stroke, heart attack or death.      After a long discussion, the patient / patient's family understood all of the risks versus benefits of surgery and decided that they wanted to proceed with surgery. The patient / guardian signed appropriate surgical consent forms.    Since this patient will be undergoing surgery, I expect the patient to be in significant additional pain in the immediate postoperative period as a result of the surgical procedure. Non-opioid pain medications will not be sufficient to treat this acute, sharp, postoperative pain. Therefore we will be prescribing the patient narcotic pain medications for the immediate postoperative period in addition to numerous non-narcotic pain medications in order to try to help the patient with their post-operative pain.  However, as the pain from surgery  subsides, we will work diligently to wean the patient off of all narcotics as quickly as possible.   If the patient requires more narcotic pain medications than we typically see with patients undergoing this surgery, we will refer the patient to a pain management specialist within the first few weeks after their surgical procedure. The patient's OARRS report was reviewed within the last 90 days.        This patient has had longstanding pain and weakness in their affected extremity which has gotten significantly worse over the last few months.  Non-operative treatment has failed to help this patient and the pain is severe enough to warrent surgery as the appropriate treatment at this time.  That would classify this problem as a chronic illness with severe exacerbation and progression.    We discussed their surgery at great length.  This is an elective major surgery which is warranted in this case due to the patient's failure of non-operative treatment and their significant level of pain and progression of the disease.  We discussed identified patient and procedural risk factors and how these risk factors may increase the risk of the surgery or influence the outcome of the surgical procedure.   We also discussed how delaying surgery and continuing non-operative treatment may lead to a progression of the disease and high risk of further morbidity.

## 2024-03-15 ENCOUNTER — OFFICE VISIT (OUTPATIENT)
Dept: PRIMARY CARE | Facility: CLINIC | Age: 44
End: 2024-03-15
Payer: COMMERCIAL

## 2024-03-15 VITALS
WEIGHT: 293 LBS | BODY MASS INDEX: 57.64 KG/M2 | SYSTOLIC BLOOD PRESSURE: 130 MMHG | OXYGEN SATURATION: 96 % | DIASTOLIC BLOOD PRESSURE: 90 MMHG | HEART RATE: 92 BPM | TEMPERATURE: 96.8 F

## 2024-03-15 DIAGNOSIS — Z01.818 PREOPERATIVE CLEARANCE: Primary | ICD-10-CM

## 2024-03-15 PROCEDURE — 3075F SYST BP GE 130 - 139MM HG: CPT

## 2024-03-15 PROCEDURE — 99214 OFFICE O/P EST MOD 30 MIN: CPT

## 2024-03-15 PROCEDURE — 1036F TOBACCO NON-USER: CPT

## 2024-03-15 PROCEDURE — 3080F DIAST BP >= 90 MM HG: CPT

## 2024-03-15 ASSESSMENT — ENCOUNTER SYMPTOMS
ENDOCRINE NEGATIVE: 1
FEVER: 0
CARDIOVASCULAR NEGATIVE: 1
ACTIVITY CHANGE: 0
SHORTNESS OF BREATH: 0
PSYCHIATRIC NEGATIVE: 1
MUSCULOSKELETAL NEGATIVE: 1
UNEXPECTED WEIGHT CHANGE: 0
ALLERGIC/IMMUNOLOGIC NEGATIVE: 1
DIZZINESS: 0
RESPIRATORY NEGATIVE: 1
WEAKNESS: 0
HEADACHES: 0
GASTROINTESTINAL NEGATIVE: 1
ABDOMINAL PAIN: 0
FATIGUE: 0

## 2024-03-15 NOTE — PROGRESS NOTES
Subjective   Patient ID: Rhonda Pizano is a 43 y.o. female who presents for presurgery (Rhonda is here for her pre surgery clearance. Going in for right knee surgery. ).  Subjective  Rhonda Pizano is a 43 y.o. female who presents to the office today for a preoperative consultation at the request of surgeon Dr. Rosa who plans on performing Knee Arthroscopy on April 4. This consultation is requested for the specific conditions prompting preoperative evaluation (i.e. because of potential effect on operative risk). Planned anesthesia: general. The patient has the following known anesthesia issues: none. Patients bleeding risk: no recent abnormal bleeding and no remote history of abnormal bleeding. Patient does not have objections to receiving blood products if needed.    [unfilled]    Objective  [unfilled]     Predictors of intubation difficulty:   Morbid obesity? yes    Anatomically abnormal facies? no   Prominent incisors? no   Receding mandible? no   Short, thick neck? no   Neck range of motion: normal   Mallampati score: I (soft palate, uvula, fauces, and tonsillar pillars visible)   Dentition: No chipped, loose, or missing teeth.    Assessment/Plan  43 y.o. female with planned surgery as above.    Known risk factors for perioperative complications: None    Difficulty with intubation is not anticipated.    1. Preoperative workup as follows none.  2. Change in medication regimen before surgery: none, continue medication regimen including morning of surgery, with sip of water.  3. Prophylaxis for cardiac events with perioperative beta-blockers: not indicated.  4. Invasive hemodynamic monitoring perioperatively: at the discretion of anesthesiologist.  5. Deep vein thrombosis prophylaxis postoperatively:regimen to be chosen by surgical team.  6. Surveillance for postoperative MI with ECG immediately postoperatively and on postoperative days 1 and 2 AND troponin levels 24 hours postoperatively and on day 4 or  hospital discharge (whichever comes first): at the discretion of anesthesiologist.  7. Other measures: Preoperative alcohol abstention x 30 days.  Preoperative tobacco abstention x 60 days.          Vitals:    03/15/24 1605   BP: 130/90   Pulse: 92   Temp: 36 °C (96.8 °F)   SpO2: 96%       Review of Systems   Constitutional:  Negative for activity change, fatigue, fever and unexpected weight change.   HENT: Negative.     Respiratory: Negative.  Negative for shortness of breath.    Cardiovascular: Negative.  Negative for chest pain.   Gastrointestinal: Negative.  Negative for abdominal pain.   Endocrine: Negative.    Musculoskeletal: Negative.    Skin: Negative.    Allergic/Immunologic: Negative.    Neurological:  Negative for dizziness, weakness and headaches.   Psychiatric/Behavioral: Negative.         Objective   Physical Exam  Vitals and nursing note reviewed.   Constitutional:       Appearance: Normal appearance.   HENT:      Head: Normocephalic.      Mouth/Throat:      Mouth: Mucous membranes are moist.   Cardiovascular:      Rate and Rhythm: Normal rate and regular rhythm.      Pulses: Normal pulses.      Heart sounds: Normal heart sounds. No murmur heard.     No friction rub. No gallop.   Pulmonary:      Effort: Pulmonary effort is normal. No respiratory distress.      Breath sounds: Normal breath sounds. No wheezing.   Abdominal:      General: Bowel sounds are normal. There is no distension.      Palpations: Abdomen is soft.      Tenderness: There is no abdominal tenderness.   Musculoskeletal:         General: No deformity. Normal range of motion.   Skin:     General: Skin is warm and dry.      Capillary Refill: Capillary refill takes less than 2 seconds.   Neurological:      General: No focal deficit present.      Mental Status: She is alert and oriented to person, place, and time.   Psychiatric:         Mood and Affect: Mood normal.         Assessment/Plan   Problem List Items Addressed This Visit     None  Visit Diagnoses       Preoperative clearance    -  Primary                 Thank you for coming in today, please call my office if you have any concerns or questions.     Clem VILLALOBOS, CNP

## 2024-03-15 NOTE — PATIENT INSTRUCTIONS
Cleared for surgical procedure    Thank you for coming in today, if any questions or concerns arise, please call my office.   GRISELDA Ambrosio-CNP     PAST SURGICAL HISTORY:  S/p bilateral carpal tunnel release     S/P excision of lipoma shoulder PAST SURGICAL HISTORY:  H/O coronary angioplasty with 2 stents    S/p bilateral carpal tunnel release     S/P excision of lipoma shoulder

## 2024-03-26 ENCOUNTER — PRE-ADMISSION TESTING (OUTPATIENT)
Dept: PREADMISSION TESTING | Facility: HOSPITAL | Age: 44
End: 2024-03-26
Payer: COMMERCIAL

## 2024-03-26 VITALS
OXYGEN SATURATION: 96 % | HEIGHT: 66 IN | TEMPERATURE: 96.6 F | RESPIRATION RATE: 16 BRPM | SYSTOLIC BLOOD PRESSURE: 160 MMHG | DIASTOLIC BLOOD PRESSURE: 97 MMHG | HEART RATE: 70 BPM | WEIGHT: 293 LBS | BODY MASS INDEX: 47.09 KG/M2

## 2024-03-26 DIAGNOSIS — M94.261 CHONDROMALACIA, KNEE, RIGHT: ICD-10-CM

## 2024-03-26 DIAGNOSIS — Z01.818 PRE-OPERATIVE EXAMINATION: Primary | ICD-10-CM

## 2024-03-26 DIAGNOSIS — S83.241A ACUTE MEDIAL MENISCUS TEAR OF RIGHT KNEE, INITIAL ENCOUNTER: ICD-10-CM

## 2024-03-26 LAB
ALBUMIN SERPL BCP-MCNC: 3.5 G/DL (ref 3.4–5)
ALP SERPL-CCNC: 80 U/L (ref 33–110)
ALT SERPL W P-5'-P-CCNC: 12 U/L (ref 7–45)
ANION GAP SERPL CALC-SCNC: 12 MMOL/L (ref 10–20)
AST SERPL W P-5'-P-CCNC: 15 U/L (ref 9–39)
BASOPHILS # BLD AUTO: 0.08 X10*3/UL (ref 0–0.1)
BASOPHILS NFR BLD AUTO: 0.9 %
BILIRUB SERPL-MCNC: 0.4 MG/DL (ref 0–1.2)
BUN SERPL-MCNC: 11 MG/DL (ref 6–23)
CALCIUM SERPL-MCNC: 8 MG/DL (ref 8.6–10.3)
CHLORIDE SERPL-SCNC: 103 MMOL/L (ref 98–107)
CO2 SERPL-SCNC: 23 MMOL/L (ref 21–32)
CREAT SERPL-MCNC: 0.75 MG/DL (ref 0.5–1.05)
EGFRCR SERPLBLD CKD-EPI 2021: >90 ML/MIN/1.73M*2
EOSINOPHIL # BLD AUTO: 0.14 X10*3/UL (ref 0–0.7)
EOSINOPHIL NFR BLD AUTO: 1.6 %
ERYTHROCYTE [DISTWIDTH] IN BLOOD BY AUTOMATED COUNT: 13.6 % (ref 11.5–14.5)
GLUCOSE SERPL-MCNC: 112 MG/DL (ref 74–99)
HCT VFR BLD AUTO: 42 % (ref 36–46)
HGB BLD-MCNC: 13.2 G/DL (ref 12–16)
IMM GRANULOCYTES # BLD AUTO: 0.05 X10*3/UL (ref 0–0.7)
IMM GRANULOCYTES NFR BLD AUTO: 0.6 % (ref 0–0.9)
LYMPHOCYTES # BLD AUTO: 2.11 X10*3/UL (ref 1.2–4.8)
LYMPHOCYTES NFR BLD AUTO: 23.5 %
MCH RBC QN AUTO: 27.3 PG (ref 26–34)
MCHC RBC AUTO-ENTMCNC: 31.4 G/DL (ref 32–36)
MCV RBC AUTO: 87 FL (ref 80–100)
MONOCYTES # BLD AUTO: 0.64 X10*3/UL (ref 0.1–1)
MONOCYTES NFR BLD AUTO: 7.1 %
NEUTROPHILS # BLD AUTO: 5.94 X10*3/UL (ref 1.2–7.7)
NEUTROPHILS NFR BLD AUTO: 66.3 %
NRBC BLD-RTO: 0 /100 WBCS (ref 0–0)
PLATELET # BLD AUTO: 300 X10*3/UL (ref 150–450)
POTASSIUM SERPL-SCNC: 4.2 MMOL/L (ref 3.5–5.3)
PROT SERPL-MCNC: 5.6 G/DL (ref 6.4–8.2)
RBC # BLD AUTO: 4.84 X10*6/UL (ref 4–5.2)
SODIUM SERPL-SCNC: 134 MMOL/L (ref 136–145)
WBC # BLD AUTO: 9 X10*3/UL (ref 4.4–11.3)

## 2024-03-26 PROCEDURE — 99203 OFFICE O/P NEW LOW 30 MIN: CPT | Performed by: REGISTERED NURSE

## 2024-03-26 PROCEDURE — 36415 COLL VENOUS BLD VENIPUNCTURE: CPT

## 2024-03-26 PROCEDURE — 80053 COMPREHEN METABOLIC PANEL: CPT

## 2024-03-26 PROCEDURE — 85025 COMPLETE CBC W/AUTO DIFF WBC: CPT

## 2024-03-26 RX ORDER — NAPROXEN 500 MG/1
500 TABLET ORAL
COMMUNITY

## 2024-03-26 ASSESSMENT — ENCOUNTER SYMPTOMS
NEUROLOGICAL NEGATIVE: 1
RESPIRATORY NEGATIVE: 1
LIMITED RANGE OF MOTION: 1
GASTROINTESTINAL NEGATIVE: 1
NECK NEGATIVE: 1
CONSTITUTIONAL NEGATIVE: 1
ARTHRALGIAS: 1
CARDIOVASCULAR NEGATIVE: 1

## 2024-03-26 ASSESSMENT — DUKE ACTIVITY SCORE INDEX (DASI)
CAN YOU DO MODERATE WORK AROUND THE HOUSE LIKE VACUUMING, SWEEPING FLOORS OR CARRYING GROCERIES: YES
TOTAL_SCORE: 18.7
CAN YOU TAKE CARE OF YOURSELF (EAT, DRESS, BATHE, OR USE TOILET): YES
CAN YOU WALK A BLOCK OR TWO ON LEVEL GROUND: YES
DASI METS SCORE: 5
CAN YOU CLIMB A FLIGHT OF STAIRS OR WALK UP A HILL: NO
CAN YOU PARTICIPATE IN STRENOUS SPORTS LIKE SWIMMING, SINGLES TENNIS, FOOTBALL, BASKETBALL, OR SKIING: NO
CAN YOU WALK INDOORS, SUCH AS AROUND YOUR HOUSE: YES
CAN YOU HAVE SEXUAL RELATIONS: YES
CAN YOU DO HEAVY WORK AROUND THE HOUSE LIKE SCRUBBING FLOORS OR LIFTING AND MOVING HEAVY FURNITURE: NO
CAN YOU PARTICIPATE IN MODERATE RECREATIONAL ACTIVITIES LIKE GOLF, BOWLING, DANCING, DOUBLES TENNIS OR THROWING A BASEBALL OR FOOTBALL: NO
CAN YOU DO YARD WORK LIKE RAKING LEAVES, WEEDING OR PUSHING A MOWER: NO
CAN YOU RUN A SHORT DISTANCE: NO
CAN YOU DO LIGHT WORK AROUND THE HOUSE LIKE DUSTING OR WASHING DISHES: YES

## 2024-03-26 ASSESSMENT — PAIN SCALES - GENERAL: PAINLEVEL_OUTOF10: 4

## 2024-03-26 ASSESSMENT — PAIN - FUNCTIONAL ASSESSMENT: PAIN_FUNCTIONAL_ASSESSMENT: 0-10

## 2024-03-26 NOTE — PREPROCEDURE INSTRUCTIONS
Medication List            Accurate as of March 26, 2024  8:12 AM. Always use your most recent med list.                albuterol 90 mcg/actuation inhaler  Medication Adjustments for Surgery: Take morning of surgery with sip of water, no other fluids     cholecalciferol 5,000 Units tablet  Commonly known as: Vitamin D-3  Medication Adjustments for Surgery: Stop 7 days before surgery     Claritin 10 mg tablet  Generic drug: loratadine  Medication Adjustments for Surgery: Stop 1 day before surgery     EPINEPHrine 0.3 mg/0.3 mL injection syringe  Commonly known as: Epipen     Flonase Sensimist 27.5 mcg/actuation nasal spray  Generic drug: fluticasone  Medication Adjustments for Surgery: Take morning of surgery with sip of water, no other fluids     FLUoxetine 40 mg capsule  Commonly known as: PROzac  TAKE 1 CAPSULE ONCE DAILY  Medication Adjustments for Surgery: Take morning of surgery with sip of water, no other fluids     losartan 50 mg tablet  Commonly known as: Cozaar  TAKE 1 TABLET ONCE DAILY  Medication Adjustments for Surgery: Stop 1 day before surgery     montelukast 10 mg tablet  Commonly known as: Singulair  TAKE 1 TABLET ONCE DAILY ATBEDTIME  Medication Adjustments for Surgery: Continue until night before surgery     naproxen 500 mg EC tablet  Commonly known as: EC Naprosyn  Medication Adjustments for Surgery: Stop 7 days before surgery     omeprazole 20 mg DR capsule  Commonly known as: PriLOSEC  TAKE 1 CAPSULE ONCE DAILY  IN THE MORNING BEFORE A    MEAL  Medication Adjustments for Surgery: Take morning of surgery with sip of water, no other fluids     TylenoL 325 mg tablet  Generic drug: acetaminophen  Medication Adjustments for Surgery: Take morning of surgery with sip of water, no other fluids                 SURGERY PRE-OPERATIVE INSTRUCTIONS    *You will receive a phone call the day before your procedure  after 2pm, (or the Friday before your surgery if scheduled on a Monday.) Generally the hospital  will be calling you with this information after that time.    *You are not to eat after midnight the night before the surgery. You may have 8oz of a clear liquid up until 2 hours prior to arriving to the hospital. The exception is with medications you were instructed to take day of surgery.    *You may take tylenol for pain/discomfort as needed.     *Stop taking all aspirin products, ibuprofen (motrin/advil), naproxen (aleve/naprosyn) for one week prior to surgery.    *Stop taking all vitamins and supplements one week prior to surgery.     *You should not have alcoholic beverages for 24 hours before surgery.     *You should not smoke 24 hours prior to surgery.     *To help prevent surgical infections bathe/shower with Dial soap the evening before surgery.    *You can wear deodorant but no lotion, powder, or perfume/cologne. You should remove all make-up and nail polish at home.    *If you wear glasses, please bring a case for the glasses with you.    *You will be asked to remove dentures and contacts.     *Please leave all valuables at home.    *You should wear loose, comfortable clothing that will accommodate bandages and/or casts.    *You should notify your doctor of any change in your condition (fever, cold, rash, etc). Surgery may need to be re-scheduled until a time you are in better health.    *A responsible adult is required to accompany you to and from the hospital if you are receiving anesthesia or a sedative. Patients are not permitted to drive for 24 hours after anesthesia.     *You can use the [employer] parking if you wish.     *If you have any further questions please call [facility] [phone].              NPO Instructions:        Additional Instructions:

## 2024-03-26 NOTE — H&P (VIEW-ONLY)
CPM/PAT Evaluation       Name: Rhonda Pizano (Rhonda Pizano)  /Age: [birth year]//43 y.o.     In-Person       Chief Complaint: Evaluation prior to surgery    HPI  43 year old female scheduled for MENISCECTOMY ARTHROSCOPY KNEE - Right on 24 with Dr. Rosa secondary to Acute medial meniscus tear of right knee, Right Knee Chondromalacia. PMHx includes HTN, Asthma, LISE, GERD, Depression, Hypothyroid, Pancytopenia. Presents to [facility] today for preoperative risk stratification and optimization.   Past Medical History:   Diagnosis Date    Acute medial meniscal injury of knee, right, initial encounter     Anemia     JEANETTE    Anxiety     Asthma     Depression     GERD (gastroesophageal reflux disease)     Hypertension     Obesity     Wears eyeglasses        No past surgical history on file.    Patient  reports being sexually active.    No family history on file.    Allergies   Allergen Reactions    Bee Venom Protein (Honey Bee) Anaphylaxis    Lisinopril Other and Cough     Reaction: Low BP    Amlodipine Unknown    Fluticasone Propion-Salmeterol Unknown    Losartan Wheezing     Pt is taking       Prior to Admission medications    Medication Sig Start Date End Date Taking? Authorizing Provider   acetaminophen (TylenoL) 325 mg tablet    Yes Historical Provider, MD   cholecalciferol (Vitamin D-3) 5,000 Units tablet Take 1 tablet (5,000 Units) by mouth once daily.   Yes Historical Provider, MD   FLUoxetine (PROzac) 40 mg capsule TAKE 1 CAPSULE ONCE DAILY 24  Yes CRUZ Ambrosio   fluticasone (Flonase Sensimist) 27.5 mcg/actuation nasal spray Administer 2 sprays into each nostril once daily.   Yes Historical Provider, MD   loratadine (Claritin) 10 mg tablet Take 1 tablet (10 mg) by mouth once daily. 3/23/22  Yes Historical Provider, MD   losartan (Cozaar) 50 mg tablet TAKE 1 TABLET ONCE DAILY 24  Yes CRUZ Ambrosio   montelukast (Singulair) 10 mg tablet TAKE 1 TABLET ONCE DAILY ATBEDTIME 24   Yes CRUZ Ambrosio   naproxen (EC Naprosyn) 500 mg EC tablet Take 1 tablet (500 mg) by mouth 2 times a day with meals. Do not crush, chew, or split.,prn   Yes Historical Provider, MD   omeprazole (PriLOSEC) 20 mg DR capsule TAKE 1 CAPSULE ONCE DAILY  IN THE MORNING BEFORE A    MEAL 1/26/24  Yes CRUZ Ambrosio   albuterol 90 mcg/actuation inhaler Inhale 4 times a day. 2/16/23   Historical Provider, MD   EPINEPHrine 0.3 mg/0.3 mL injection syringe INJECT THE CONTENTS OF ONE SYRINGE one TIME AS NEEDED FOR ALLERGIC REACTION. 5/11/20   Historical Provider, MD   cholecalciferol (Vitamin D-3) 50 MCG (2000 UT) tablet Take 1 tablet (50 mcg) by mouth once daily. 5/4/21 3/26/24  Historical Provider, MD   DULoxetine (Cymbalta) 20 mg DR capsule Take 1 capsule (20 mg) by mouth once daily. 10/29/21 3/26/24  Historical Provider, MD   DULoxetine (Cymbalta) 60 mg DR capsule Take 1 capsule (60 mg) by mouth once daily.  3/26/24  Historical Provider, MD   FLUoxetine (PROzac) 20 mg capsule Take 1 capsule (20 mg) by mouth once daily. 4/25/22 3/26/24  Historical Provider, MD   guaiFENesin (Robitussin) 100 mg/5 mL syrup 10 mL every 4 hours if needed. 11/6/17 3/26/24  Historical Provider, MD   ibuprofen 600 mg tablet Take 1 tablet (600 mg) by mouth every 6 hours if needed (pain).  3/26/24  Historical Provider, MD   ipratropium-albuteroL (Duo-Neb) 0.5-2.5 mg/3 mL nebulizer solution Take 3 mL by nebulization every 6 hours if needed. 2/14/19 3/26/24  Historical Provider, MD   loratadine-pseudoephedrine (Claritin-D 12-hour) 5-120 mg 12 hr tablet Take 1 tablet by mouth 2 times a day. Do not crush, chew, or split.  3/26/24  Historical Provider, MD   meloxicam (Mobic) 15 mg tablet Take 1 tablet (15 mg) by mouth once daily. With food 3/30/23 3/26/24  Historical Provider, MD   omeprazole (PriLOSEC) 10 mg DR capsule Take 1 capsule (10 mg) by mouth once daily.  3/26/24  Historical Provider, MD CORNEJO ROS:   Constitutional:    neg    Neuro/Psych:   neg    Eyes:    use of corrective lenses  Ears:   neg    Nose:   Mouth:   neg    Throat:   neg    Neck:   neg    Cardio:   neg    Respiratory:   neg    Endocrine:   GI:   neg    :   neg    Musculoskeletal:    Pain right knee 4/10   arthralgias   decreased ROM  Hematologic:   neg    Skin:  neg        Physical Exam  Vitals reviewed.   Constitutional:       Appearance: Normal appearance.   HENT:      Head: Normocephalic and atraumatic.      Nose: Nose normal.      Mouth/Throat:      Mouth: Mucous membranes are moist.      Pharynx: Oropharynx is clear.   Eyes:      Pupils: Pupils are equal, round, and reactive to light.   Neck:      Vascular: No carotid bruit.   Cardiovascular:      Rate and Rhythm: Normal rate and regular rhythm.      Pulses: Normal pulses.      Heart sounds: Normal heart sounds.   Pulmonary:      Effort: Pulmonary effort is normal.      Breath sounds: Normal breath sounds.   Abdominal:      Palpations: Abdomen is soft.   Musculoskeletal:         General: Tenderness present.      Cervical back: Normal range of motion and neck supple.      Comments: Right knee   Skin:     General: Skin is warm and dry.   Neurological:      General: No focal deficit present.      Mental Status: She is alert and oriented to person, place, and time.   Psychiatric:         Mood and Affect: Mood normal.         Behavior: Behavior normal.         Thought Content: Thought content normal.         Judgment: Judgment normal.          PAT AIRWAY:   Airway:      Thick neck tissue    Mallampati::  III    TM distance::  >3 FB    Neck ROM::  Full  normal        Visit Vitals  BP (!) 160/97   Pulse 70   Temp 35.9 °C (96.6 °F) (Tympanic)   Resp 16       DASI Risk Score      Flowsheet Row Most Recent Value   DASI SCORE 18.7   METS Score (Will be calculated only when all the questions are answered) 5          Caprini DVT Assessment      Flowsheet Row Most Recent Value   DVT Score 6   Current Status Major surgery  planned, including arthroscopic and laproscopic (1-2 hours)   Age 40-59 years   BMI Greater than 50 (Venous stasis syndrome)          Modified Frailty Index    No data to display       CHADS2 Stroke Risk  Current as of 15 minutes ago        N/A 3 - 100%: High Risk   2 - 3%: Medium Risk   0 - 2%: Low Risk     Last Change: N/A          This score determines the patient's risk of having a stroke if the patient has atrial fibrillation.        This score is not applicable to this patient. Components are not calculated.          Revised Cardiac Risk Index    No data to display       Apfel Simplified Score    No data to display       Risk Analysis Index Results This Encounter    No data found in the last 1 encounters.       Stop Bang Score      Flowsheet Row Most Recent Value   Do you snore loudly? 1   Do you often feel tired or fatigued after your sleep? 0   Has anyone ever observed you stop breathing in your sleep? 1   Do you have or are you being treated for high blood pressure? 1   Recent BMI (Calculated) 54.9   Is BMI greater than 35 kg/m2? 1=Yes   Age older than 50 years old? 0=No   Is your neck circumference greater than 17 inches (Male) or 16 inches (Female)? 1   Gender - Male 0=No   STOP-BANG Total Score 4            Assessment and Plan:     Medical Clearance obtained from [facility]skar VILLALOBOS 3/15/24, Cleared for surgical procedure.     Anesthesia:  The patient denies problems with anesthesia in the past such as PONV, prolonged sedation, awareness, dental damage, aspiration, cardiac arrest, difficult intubation, or unexpected hospital admissions.     Neuro:   The patient has diagnoses or significant findings on chart review or clinical presentation and evaluation significant for Depression. Handouts for preoperative brain exercises given to patient.    HEENT/Airway  The patient has diagnoses, significant findings on chart review, clinical presentation or evaluation of short thick neck.    Cardiovascular  RCRI  The  patient meets 0-1 RCRI criteria and therefore has a less than 1% risk of major adverse cardiac complications.  METS  The patient's functional capacity capacity is less than 4 METS.  Heart Failure  The patient has no known history of heart failure.  Additionally, the patient reports no symptoms of heart failure and demonstrates no signs of heart failure.  Hypertension Evaluation  The patient has a known history of hypertension that is controlled on losartan.     Heart Rhythm Evaluation  The patient has no history of arrhythmias.  Heart Valve Evaluation  The patient has no known history of valvular heart disease. The patient has no symptoms or physical exam findings to suggest valvular heart disease.  Cardiology Evaluation  The patient is not followed by cardiology.  JADA score which indicates a 0.4% risk of intraoperative or 30-day postoperative.    Pulmonary   The patient has findings on chart review, clinical presentation and evaluation significant for LISE compliant with Cpap, Asthma. The patient is at increased risk of perioperative pulmonary complications secondary to LISE, morbid obesity.    The patient has a stop bang score of 4, which places patient at intermediate risk for having LISE.    ARISCAT 0, low, 1.6% risk of in-hospital postoperative pulmonary complications  PRODIGY 5, low risk of respiratory depression episode. Patient given PI sheet for preoperative deep breathing exercises.    Hematology  The patient has diagnoses or significant findings on chart review or clinical presentation and evaluation significant for history of pancytopenia.  Antiplatelet management   The patient is not currently receiving antiplatelet therapy.  Anticoagulation management  The patient is not currently receiving anticoagulation therapy. Patient provided with DVT educational handout.      Caprini score 6, high risk of perioperative VTE.     Patient instructed to ambulate as soon as possible postoperatively to decrease  thromboembolic risk. Initiate mechanical DVT prophylaxis as soon as possible and initiate chemical prophylaxis when deemed safe from a bleeding standpoint post surgery.       Gastrointestinal  The patient has diagnoses or significant findings on chart review or clinical presentation and evaluation significant for GERD on prilosec.  Eat 10- 0,  self-perceived oropharyngeal dysphagia scale (0-40)     Genitourinary  No diagnoses or significant findings on chart review or clinical presentation and evaluation.    Renal  The patient has no known history of chronic kidney disease.    Musculoskeletal  The patient has diagnoses or significant findings on chart review or clinical presentation and evaluation significant for Acute medial meniscus tear of right knee,  Right Knee Chondromalacia.  1/31/24 XRAY Right Knee  FINDINGS:  No acute fracture or malalignment.  Mild medial compartment osteoarthrosis with mild joint space loss.  No significant knee joint effusion.  Soft tissues are unremarkable.      IMPRESSION:  1. Mild medial compartment osteoarthrosis.  11/25/23 MRI Right Knee  FINDINGS:  LIGAMENTS AND TENDONS:  The anterior cruciate ligament and the posterior cruciate ligaments  are intact. The medial collateral ligament is intact. The lateral  collateral ligament, the biceps femoris tendon, the popliteus tendon  and the iliotibial band are intact. The quadriceps tendon and the  patellar tendon are intact.      MENISCI:  There is a complex predominantly radial tear of the body of the  medial meniscus with extension into the posterior horn. There is  extrusion of the remnant of the medial meniscal body by 3 mm. The  lateral meniscus is intact and without evidence of tear.      JOINTS:  The articular cartilage of the medial femoral condyle and medial  tibial plateau demonstrates mild cartilage loss with osteophytosis.  The articular cartilage of the lateral femoral condyle and lateral  tibial plateau is intact and without  evidence of full thickness  defect. There is mild cartilage loss in the lateral patellar facet  with marrow reactive changes. There is a small effusion. There is no  Baker's cyst.      OSSEOUS STRUCTURES:  There is hematopoietic marrow reconversion in the distal femur and  the proximal tibia. There is no fracture. There is no marrow  replacing lesion.      SOFT TISSUES:  There is prepatellar soft tissue edema  There is no muscle atrophy or tear.  The common peroneal nerve is intact.      IMPRESSION:  1.  Complex predominantly radial tear of the body of the medial  meniscus with extension to the posterior horn.  2. Mild cartilage loss in the medial and patellofemoral compartments.  3. Small effusion  4. Hematopoietic marrow reconversion in the distal femur and proximal  tibia, a nonspecific finding which can be seen with hematologic  disorders, anemia and smoking among other etiologies  Endocrine  Diabetes Evaluation  The patient has no history of diabetes mellitus.  5/4/23 A1C 5.0  Thyroid Disease Evaluation  The patient has a history of hypothyroidism.   5/4/23 TSH 1.03  ID  No diagnoses or significant findings on chart review or clinical presentation and evaluation.    -Preoperative medication instructions were provided and reviewed with the patient.  Any additional testing or evaluation was explained to the patient.  NPO Instructions were discussed, and the patient's questions were answered prior to conclusion of this encounter.

## 2024-03-26 NOTE — CPM/PAT H&P
CPM/PAT Evaluation       Name: Rhonda Pizano (Rhonda Pizano)  /Age: 10[birth year]20//43 y.o.     In-Person       Chief Complaint: Evaluation prior to surgery    HPI  43 year old female scheduled for MENISCECTOMY ARTHROSCOPY KNEE - Right on 24 with Dr. Rosa secondary to Acute medial meniscus tear of right knee, Right Knee Chondromalacia. PMHx includes HTN, Asthma, LISE, GERD, Depression, Hypothyroid, Pancytopenia. Presents to [facility] today for preoperative risk stratification and optimization.   Past Medical History:   Diagnosis Date    Acute medial meniscal injury of knee, right, initial encounter     Anemia     JEANETTE    Anxiety     Asthma     Depression     GERD (gastroesophageal reflux disease)     Hypertension     Obesity     Wears eyeglasses        No past surgical history on file.    Patient  reports being sexually active.    No family history on file.    Allergies   Allergen Reactions    Bee Venom Protein (Honey Bee) Anaphylaxis    Lisinopril Other and Cough     Reaction: Low BP    Amlodipine Unknown    Fluticasone Propion-Salmeterol Unknown    Losartan Wheezing     Pt is taking       Prior to Admission medications    Medication Sig Start Date End Date Taking? Authorizing Provider   acetaminophen (TylenoL) 325 mg tablet    Yes Historical Provider, MD   cholecalciferol (Vitamin D-3) 5,000 Units tablet Take 1 tablet (5,000 Units) by mouth once daily.   Yes Historical Provider, MD   FLUoxetine (PROzac) 40 mg capsule TAKE 1 CAPSULE ONCE DAILY 24  Yes CRUZ Ambrosio   fluticasone (Flonase Sensimist) 27.5 mcg/actuation nasal spray Administer 2 sprays into each nostril once daily.   Yes Historical Provider, MD   loratadine (Claritin) 10 mg tablet Take 1 tablet (10 mg) by mouth once daily. 3/23/22  Yes Historical Provider, MD   losartan (Cozaar) 50 mg tablet TAKE 1 TABLET ONCE DAILY 24  Yes CRUZ Ambrosio   montelukast (Singulair) 10 mg tablet TAKE 1 TABLET ONCE DAILY ATBEDTIME 24   Yes CRUZ Ambrosio   naproxen (EC Naprosyn) 500 mg EC tablet Take 1 tablet (500 mg) by mouth 2 times a day with meals. Do not crush, chew, or split.,prn   Yes Historical Provider, MD   omeprazole (PriLOSEC) 20 mg DR capsule TAKE 1 CAPSULE ONCE DAILY  IN THE MORNING BEFORE A    MEAL 1/26/24  Yes CRUZ Ambrosio   albuterol 90 mcg/actuation inhaler Inhale 4 times a day. 2/16/23   Historical Provider, MD   EPINEPHrine 0.3 mg/0.3 mL injection syringe INJECT THE CONTENTS OF ONE SYRINGE one TIME AS NEEDED FOR ALLERGIC REACTION. 5/11/20   Historical Provider, MD   cholecalciferol (Vitamin D-3) 50 MCG (2000 UT) tablet Take 1 tablet (50 mcg) by mouth once daily. 5/4/21 3/26/24  Historical Provider, MD   DULoxetine (Cymbalta) 20 mg DR capsule Take 1 capsule (20 mg) by mouth once daily. 10/29/21 3/26/24  Historical Provider, MD   DULoxetine (Cymbalta) 60 mg DR capsule Take 1 capsule (60 mg) by mouth once daily.  3/26/24  Historical Provider, MD   FLUoxetine (PROzac) 20 mg capsule Take 1 capsule (20 mg) by mouth once daily. 4/25/22 3/26/24  Historical Provider, MD   guaiFENesin (Robitussin) 100 mg/5 mL syrup 10 mL every 4 hours if needed. 11/6/17 3/26/24  Historical Provider, MD   ibuprofen 600 mg tablet Take 1 tablet (600 mg) by mouth every 6 hours if needed (pain).  3/26/24  Historical Provider, MD   ipratropium-albuteroL (Duo-Neb) 0.5-2.5 mg/3 mL nebulizer solution Take 3 mL by nebulization every 6 hours if needed. 2/14/19 3/26/24  Historical Provider, MD   loratadine-pseudoephedrine (Claritin-D 12-hour) 5-120 mg 12 hr tablet Take 1 tablet by mouth 2 times a day. Do not crush, chew, or split.  3/26/24  Historical Provider, MD   meloxicam (Mobic) 15 mg tablet Take 1 tablet (15 mg) by mouth once daily. With food 3/30/23 3/26/24  Historical Provider, MD   omeprazole (PriLOSEC) 10 mg DR capsule Take 1 capsule (10 mg) by mouth once daily.  3/26/24  Historical Provider, MD CORNEJO ROS:   Constitutional:    neg    Neuro/Psych:   neg    Eyes:    use of corrective lenses  Ears:   neg    Nose:   Mouth:   neg    Throat:   neg    Neck:   neg    Cardio:   neg    Respiratory:   neg    Endocrine:   GI:   neg    :   neg    Musculoskeletal:    Pain right knee 4/10   arthralgias   decreased ROM  Hematologic:   neg    Skin:  neg        Physical Exam  Vitals reviewed.   Constitutional:       Appearance: Normal appearance.   HENT:      Head: Normocephalic and atraumatic.      Nose: Nose normal.      Mouth/Throat:      Mouth: Mucous membranes are moist.      Pharynx: Oropharynx is clear.   Eyes:      Pupils: Pupils are equal, round, and reactive to light.   Neck:      Vascular: No carotid bruit.   Cardiovascular:      Rate and Rhythm: Normal rate and regular rhythm.      Pulses: Normal pulses.      Heart sounds: Normal heart sounds.   Pulmonary:      Effort: Pulmonary effort is normal.      Breath sounds: Normal breath sounds.   Abdominal:      Palpations: Abdomen is soft.   Musculoskeletal:         General: Tenderness present.      Cervical back: Normal range of motion and neck supple.      Comments: Right knee   Skin:     General: Skin is warm and dry.   Neurological:      General: No focal deficit present.      Mental Status: She is alert and oriented to person, place, and time.   Psychiatric:         Mood and Affect: Mood normal.         Behavior: Behavior normal.         Thought Content: Thought content normal.         Judgment: Judgment normal.          PAT AIRWAY:   Airway:      Thick neck tissue    Mallampati::  III    TM distance::  >3 FB    Neck ROM::  Full  normal        Visit Vitals  BP (!) 160/97   Pulse 70   Temp 35.9 °C (96.6 °F) (Tympanic)   Resp 16       DASI Risk Score      Flowsheet Row Most Recent Value   DASI SCORE 18.7   METS Score (Will be calculated only when all the questions are answered) 5          Caprini DVT Assessment      Flowsheet Row Most Recent Value   DVT Score 6   Current Status Major surgery  planned, including arthroscopic and laproscopic (1-2 hours)   Age 40-59 years   BMI Greater than 50 (Venous stasis syndrome)          Modified Frailty Index    No data to display       CHADS2 Stroke Risk  Current as of 15 minutes ago        N/A 3 - 100%: High Risk   2 - 3%: Medium Risk   0 - 2%: Low Risk     Last Change: N/A          This score determines the patient's risk of having a stroke if the patient has atrial fibrillation.        This score is not applicable to this patient. Components are not calculated.          Revised Cardiac Risk Index    No data to display       Apfel Simplified Score    No data to display       Risk Analysis Index Results This Encounter    No data found in the last 1 encounters.       Stop Bang Score      Flowsheet Row Most Recent Value   Do you snore loudly? 1   Do you often feel tired or fatigued after your sleep? 0   Has anyone ever observed you stop breathing in your sleep? 1   Do you have or are you being treated for high blood pressure? 1   Recent BMI (Calculated) 54.9   Is BMI greater than 35 kg/m2? 1=Yes   Age older than 50 years old? 0=No   Is your neck circumference greater than 17 inches (Male) or 16 inches (Female)? 1   Gender - Male 0=No   STOP-BANG Total Score 4            Assessment and Plan:     Medical Clearance obtained from [facility]ILLAL[facility] 3/15/24, Cleared for surgical procedure.     Anesthesia:  The patient denies problems with anesthesia in the past such as PONV, prolonged sedation, awareness, dental damage, aspiration, cardiac arrest, difficult intubation, or unexpected hospital admissions.     Neuro:   The patient has diagnoses or significant findings on chart review or clinical presentation and evaluation significant for Depression. Handouts for preoperative brain exercises given to patient.    HEENT/Airway  The patient has diagnoses, significant findings on chart review, clinical presentation or evaluation of short thick neck.    Cardiovascular  RCRI  The  patient meets 0-1 RCRI criteria and therefore has a less than 1% risk of major adverse cardiac complications.  METS  The patient's functional capacity capacity is less than 4 METS.  Heart Failure  The patient has no known history of heart failure.  Additionally, the patient reports no symptoms of heart failure and demonstrates no signs of heart failure.  Hypertension Evaluation  The patient has a known history of hypertension that is controlled on losartan.     Heart Rhythm Evaluation  The patient has no history of arrhythmias.  Heart Valve Evaluation  The patient has no known history of valvular heart disease. The patient has no symptoms or physical exam findings to suggest valvular heart disease.  Cardiology Evaluation  The patient is not followed by cardiology.  JADA score which indicates a 0.4% risk of intraoperative or 30-day postoperative.    Pulmonary   The patient has findings on chart review, clinical presentation and evaluation significant for LISE compliant with Cpap, Asthma. The patient is at increased risk of perioperative pulmonary complications secondary to LISE, morbid obesity.    The patient has a stop bang score of 4, which places patient at intermediate risk for having LISE.    ARISCAT 0, low, 1.6% risk of in-hospital postoperative pulmonary complications  PRODIGY 5, low risk of respiratory depression episode. Patient given PI sheet for preoperative deep breathing exercises.    Hematology  The patient has diagnoses or significant findings on chart review or clinical presentation and evaluation significant for history of pancytopenia.  Antiplatelet management   The patient is not currently receiving antiplatelet therapy.  Anticoagulation management  The patient is not currently receiving anticoagulation therapy. Patient provided with DVT educational handout.      Caprini score 6, high risk of perioperative VTE.     Patient instructed to ambulate as soon as possible postoperatively to decrease  thromboembolic risk. Initiate mechanical DVT prophylaxis as soon as possible and initiate chemical prophylaxis when deemed safe from a bleeding standpoint post surgery.       Gastrointestinal  The patient has diagnoses or significant findings on chart review or clinical presentation and evaluation significant for GERD on prilosec.  Eat 10- 0,  self-perceived oropharyngeal dysphagia scale (0-40)     Genitourinary  No diagnoses or significant findings on chart review or clinical presentation and evaluation.    Renal  The patient has no known history of chronic kidney disease.    Musculoskeletal  The patient has diagnoses or significant findings on chart review or clinical presentation and evaluation significant for Acute medial meniscus tear of right knee,  Right Knee Chondromalacia.  1/31/24 XRAY Right Knee  FINDINGS:  No acute fracture or malalignment.  Mild medial compartment osteoarthrosis with mild joint space loss.  No significant knee joint effusion.  Soft tissues are unremarkable.      IMPRESSION:  1. Mild medial compartment osteoarthrosis.  11/25/23 MRI Right Knee  FINDINGS:  LIGAMENTS AND TENDONS:  The anterior cruciate ligament and the posterior cruciate ligaments  are intact. The medial collateral ligament is intact. The lateral  collateral ligament, the biceps femoris tendon, the popliteus tendon  and the iliotibial band are intact. The quadriceps tendon and the  patellar tendon are intact.      MENISCI:  There is a complex predominantly radial tear of the body of the  medial meniscus with extension into the posterior horn. There is  extrusion of the remnant of the medial meniscal body by 3 mm. The  lateral meniscus is intact and without evidence of tear.      JOINTS:  The articular cartilage of the medial femoral condyle and medial  tibial plateau demonstrates mild cartilage loss with osteophytosis.  The articular cartilage of the lateral femoral condyle and lateral  tibial plateau is intact and without  evidence of full thickness  defect. There is mild cartilage loss in the lateral patellar facet  with marrow reactive changes. There is a small effusion. There is no  Baker's cyst.      OSSEOUS STRUCTURES:  There is hematopoietic marrow reconversion in the distal femur and  the proximal tibia. There is no fracture. There is no marrow  replacing lesion.      SOFT TISSUES:  There is prepatellar soft tissue edema  There is no muscle atrophy or tear.  The common peroneal nerve is intact.      IMPRESSION:  1.  Complex predominantly radial tear of the body of the medial  meniscus with extension to the posterior horn.  2. Mild cartilage loss in the medial and patellofemoral compartments.  3. Small effusion  4. Hematopoietic marrow reconversion in the distal femur and proximal  tibia, a nonspecific finding which can be seen with hematologic  disorders, anemia and smoking among other etiologies  Endocrine  Diabetes Evaluation  The patient has no history of diabetes mellitus.  5/4/23 A1C 5.0  Thyroid Disease Evaluation  The patient has a history of hypothyroidism.   5/4/23 TSH 1.03  ID  No diagnoses or significant findings on chart review or clinical presentation and evaluation.    -Preoperative medication instructions were provided and reviewed with the patient.  Any additional testing or evaluation was explained to the patient.  NPO Instructions were discussed, and the patient's questions were answered prior to conclusion of this encounter.

## 2024-04-02 ENCOUNTER — ANESTHESIA EVENT (OUTPATIENT)
Dept: OPERATING ROOM | Facility: HOSPITAL | Age: 44
End: 2024-04-02
Payer: COMMERCIAL

## 2024-04-04 ENCOUNTER — HOSPITAL ENCOUNTER (OUTPATIENT)
Facility: HOSPITAL | Age: 44
Setting detail: OUTPATIENT SURGERY
Discharge: HOME | End: 2024-04-04
Attending: ORTHOPAEDIC SURGERY | Admitting: ORTHOPAEDIC SURGERY
Payer: COMMERCIAL

## 2024-04-04 ENCOUNTER — PHARMACY VISIT (OUTPATIENT)
Dept: PHARMACY | Facility: CLINIC | Age: 44
End: 2024-04-04
Payer: MEDICARE

## 2024-04-04 ENCOUNTER — ANESTHESIA (OUTPATIENT)
Dept: OPERATING ROOM | Facility: HOSPITAL | Age: 44
End: 2024-04-04
Payer: COMMERCIAL

## 2024-04-04 VITALS
WEIGHT: 293 LBS | BODY MASS INDEX: 47.09 KG/M2 | SYSTOLIC BLOOD PRESSURE: 114 MMHG | TEMPERATURE: 97.7 F | OXYGEN SATURATION: 96 % | RESPIRATION RATE: 16 BRPM | HEART RATE: 70 BPM | DIASTOLIC BLOOD PRESSURE: 63 MMHG | HEIGHT: 66 IN

## 2024-04-04 DIAGNOSIS — S83.281A ACUTE LATERAL MENISCUS TEAR OF RIGHT KNEE, INITIAL ENCOUNTER: ICD-10-CM

## 2024-04-04 DIAGNOSIS — S83.241A ACUTE MEDIAL MENISCUS TEAR OF RIGHT KNEE, INITIAL ENCOUNTER: ICD-10-CM

## 2024-04-04 DIAGNOSIS — M94.261 CHONDROMALACIA, KNEE, RIGHT: Primary | ICD-10-CM

## 2024-04-04 PROCEDURE — 2500000002 HC RX 250 W HCPCS SELF ADMINISTERED DRUGS (ALT 637 FOR MEDICARE OP, ALT 636 FOR OP/ED): Performed by: ANESTHESIOLOGY

## 2024-04-04 PROCEDURE — 3700000001 HC GENERAL ANESTHESIA TIME - INITIAL BASE CHARGE: Performed by: ORTHOPAEDIC SURGERY

## 2024-04-04 PROCEDURE — 2500000004 HC RX 250 GENERAL PHARMACY W/ HCPCS (ALT 636 FOR OP/ED): Performed by: NURSE ANESTHETIST, CERTIFIED REGISTERED

## 2024-04-04 PROCEDURE — RXMED WILLOW AMBULATORY MEDICATION CHARGE

## 2024-04-04 PROCEDURE — 3600000004 HC OR TIME - INITIAL BASE CHARGE - PROCEDURE LEVEL FOUR: Performed by: ORTHOPAEDIC SURGERY

## 2024-04-04 PROCEDURE — 2500000005 HC RX 250 GENERAL PHARMACY W/O HCPCS: Performed by: ORTHOPAEDIC SURGERY

## 2024-04-04 PROCEDURE — 29880 ARTHRS KNE SRG MNISECTMY M&L: CPT | Performed by: ORTHOPAEDIC SURGERY

## 2024-04-04 PROCEDURE — 7100000010 HC PHASE TWO TIME - EACH INCREMENTAL 1 MINUTE: Performed by: ORTHOPAEDIC SURGERY

## 2024-04-04 PROCEDURE — 7100000009 HC PHASE TWO TIME - INITIAL BASE CHARGE: Performed by: ORTHOPAEDIC SURGERY

## 2024-04-04 PROCEDURE — 3600000009 HC OR TIME - EACH INCREMENTAL 1 MINUTE - PROCEDURE LEVEL FOUR: Performed by: ORTHOPAEDIC SURGERY

## 2024-04-04 PROCEDURE — 7100000001 HC RECOVERY ROOM TIME - INITIAL BASE CHARGE: Performed by: ORTHOPAEDIC SURGERY

## 2024-04-04 PROCEDURE — 2500000005 HC RX 250 GENERAL PHARMACY W/O HCPCS: Performed by: NURSE ANESTHETIST, CERTIFIED REGISTERED

## 2024-04-04 PROCEDURE — 2500000004 HC RX 250 GENERAL PHARMACY W/ HCPCS (ALT 636 FOR OP/ED): Performed by: ANESTHESIOLOGY

## 2024-04-04 PROCEDURE — 7100000002 HC RECOVERY ROOM TIME - EACH INCREMENTAL 1 MINUTE: Performed by: ORTHOPAEDIC SURGERY

## 2024-04-04 PROCEDURE — 29879 ARTHRS KNE SRG ABRASJ ARTHRP: CPT | Performed by: ORTHOPAEDIC SURGERY

## 2024-04-04 PROCEDURE — 2720000007 HC OR 272 NO HCPCS: Performed by: ORTHOPAEDIC SURGERY

## 2024-04-04 PROCEDURE — 3700000002 HC GENERAL ANESTHESIA TIME - EACH INCREMENTAL 1 MINUTE: Performed by: ORTHOPAEDIC SURGERY

## 2024-04-04 RX ORDER — BUPIVACAINE HYDROCHLORIDE AND EPINEPHRINE 2.5; 5 MG/ML; UG/ML
INJECTION, SOLUTION EPIDURAL; INFILTRATION; INTRACAUDAL; PERINEURAL AS NEEDED
Status: DISCONTINUED | OUTPATIENT
Start: 2024-04-04 | End: 2024-04-04 | Stop reason: HOSPADM

## 2024-04-04 RX ORDER — FENTANYL CITRATE 50 UG/ML
INJECTION, SOLUTION INTRAMUSCULAR; INTRAVENOUS AS NEEDED
Status: DISCONTINUED | OUTPATIENT
Start: 2024-04-04 | End: 2024-04-04

## 2024-04-04 RX ORDER — LIDOCAINE HYDROCHLORIDE 20 MG/ML
INJECTION, SOLUTION INFILTRATION; PERINEURAL AS NEEDED
Status: DISCONTINUED | OUTPATIENT
Start: 2024-04-04 | End: 2024-04-04

## 2024-04-04 RX ORDER — SODIUM CHLORIDE, SODIUM LACTATE, POTASSIUM CHLORIDE, CALCIUM CHLORIDE 600; 310; 30; 20 MG/100ML; MG/100ML; MG/100ML; MG/100ML
100 INJECTION, SOLUTION INTRAVENOUS CONTINUOUS
Status: DISCONTINUED | OUTPATIENT
Start: 2024-04-04 | End: 2024-04-04 | Stop reason: HOSPADM

## 2024-04-04 RX ORDER — MIDAZOLAM HYDROCHLORIDE 1 MG/ML
INJECTION INTRAMUSCULAR; INTRAVENOUS AS NEEDED
Status: DISCONTINUED | OUTPATIENT
Start: 2024-04-04 | End: 2024-04-04

## 2024-04-04 RX ORDER — ONDANSETRON HYDROCHLORIDE 2 MG/ML
4 INJECTION, SOLUTION INTRAVENOUS ONCE AS NEEDED
Status: DISCONTINUED | OUTPATIENT
Start: 2024-04-04 | End: 2024-04-04 | Stop reason: HOSPADM

## 2024-04-04 RX ORDER — ASPIRIN 325 MG
325 TABLET, DELAYED RELEASE (ENTERIC COATED) ORAL 2 TIMES DAILY
Qty: 60 TABLET | Refills: 0 | Status: SHIPPED | OUTPATIENT
Start: 2024-04-04 | End: 2024-05-04

## 2024-04-04 RX ORDER — ROCURONIUM BROMIDE 10 MG/ML
INJECTION, SOLUTION INTRAVENOUS AS NEEDED
Status: DISCONTINUED | OUTPATIENT
Start: 2024-04-04 | End: 2024-04-04

## 2024-04-04 RX ORDER — OXYCODONE HYDROCHLORIDE 5 MG/1
5 TABLET ORAL EVERY 4 HOURS PRN
Status: DISCONTINUED | OUTPATIENT
Start: 2024-04-04 | End: 2024-04-04 | Stop reason: HOSPADM

## 2024-04-04 RX ORDER — DROPERIDOL 2.5 MG/ML
0.62 INJECTION, SOLUTION INTRAMUSCULAR; INTRAVENOUS ONCE AS NEEDED
Status: DISCONTINUED | OUTPATIENT
Start: 2024-04-04 | End: 2024-04-04 | Stop reason: HOSPADM

## 2024-04-04 RX ORDER — METOPROLOL TARTRATE 1 MG/ML
INJECTION, SOLUTION INTRAVENOUS AS NEEDED
Status: DISCONTINUED | OUTPATIENT
Start: 2024-04-04 | End: 2024-04-04

## 2024-04-04 RX ORDER — IPRATROPIUM BROMIDE AND ALBUTEROL SULFATE 2.5; .5 MG/3ML; MG/3ML
3 SOLUTION RESPIRATORY (INHALATION) ONCE
Status: COMPLETED | OUTPATIENT
Start: 2024-04-04 | End: 2024-04-04

## 2024-04-04 RX ORDER — GABAPENTIN 300 MG/1
300 CAPSULE ORAL NIGHTLY
Qty: 5 CAPSULE | Refills: 0 | Status: SHIPPED | OUTPATIENT
Start: 2024-04-04 | End: 2024-04-22 | Stop reason: WASHOUT

## 2024-04-04 RX ORDER — DOCUSATE SODIUM 100 MG/1
100 CAPSULE, LIQUID FILLED ORAL 2 TIMES DAILY PRN
Qty: 28 CAPSULE | Refills: 0 | Status: SHIPPED | OUTPATIENT
Start: 2024-04-04 | End: 2024-04-18

## 2024-04-04 RX ORDER — CEFAZOLIN 1 G/1
INJECTION, POWDER, FOR SOLUTION INTRAVENOUS AS NEEDED
Status: DISCONTINUED | OUTPATIENT
Start: 2024-04-04 | End: 2024-04-04

## 2024-04-04 RX ORDER — MELOXICAM 15 MG/1
15 TABLET ORAL DAILY PRN
Qty: 30 TABLET | Refills: 0 | Status: SHIPPED | OUTPATIENT
Start: 2024-04-04 | End: 2024-05-04

## 2024-04-04 RX ORDER — ACETAMINOPHEN 500 MG
1000 TABLET ORAL EVERY 8 HOURS PRN
Qty: 90 TABLET | Refills: 0 | Status: SHIPPED | OUTPATIENT
Start: 2024-04-04

## 2024-04-04 RX ORDER — PROPOFOL 10 MG/ML
INJECTION, EMULSION INTRAVENOUS AS NEEDED
Status: DISCONTINUED | OUTPATIENT
Start: 2024-04-04 | End: 2024-04-04

## 2024-04-04 RX ORDER — OXYCODONE HYDROCHLORIDE 5 MG/1
5 TABLET ORAL EVERY 6 HOURS PRN
Qty: 10 TABLET | Refills: 0 | Status: SHIPPED | OUTPATIENT
Start: 2024-04-04

## 2024-04-04 RX ORDER — ONDANSETRON HYDROCHLORIDE 2 MG/ML
INJECTION, SOLUTION INTRAVENOUS AS NEEDED
Status: DISCONTINUED | OUTPATIENT
Start: 2024-04-04 | End: 2024-04-04

## 2024-04-04 RX ORDER — HYDROMORPHONE HYDROCHLORIDE 2 MG/ML
INJECTION, SOLUTION INTRAMUSCULAR; INTRAVENOUS; SUBCUTANEOUS AS NEEDED
Status: DISCONTINUED | OUTPATIENT
Start: 2024-04-04 | End: 2024-04-04

## 2024-04-04 RX ADMIN — SODIUM CHLORIDE, POTASSIUM CHLORIDE, SODIUM LACTATE AND CALCIUM CHLORIDE 100 ML/HR: 600; 310; 30; 20 INJECTION, SOLUTION INTRAVENOUS at 08:09

## 2024-04-04 RX ADMIN — IPRATROPIUM BROMIDE AND ALBUTEROL SULFATE 3 ML: 2.5; .5 SOLUTION RESPIRATORY (INHALATION) at 08:18

## 2024-04-04 RX ADMIN — DEXAMETHASONE SODIUM PHOSPHATE 8 MG: 4 INJECTION INTRA-ARTICULAR; INTRALESIONAL; INTRAMUSCULAR; INTRAVENOUS; SOFT TISSUE at 08:43

## 2024-04-04 RX ADMIN — HYDROMORPHONE HYDROCHLORIDE 0.4 MG: 2 INJECTION, SOLUTION INTRAMUSCULAR; INTRAVENOUS; SUBCUTANEOUS at 09:05

## 2024-04-04 RX ADMIN — LIDOCAINE HYDROCHLORIDE 100 MG: 20 INJECTION, SOLUTION INFILTRATION; PERINEURAL at 08:27

## 2024-04-04 RX ADMIN — FENTANYL CITRATE 50 MCG: 50 INJECTION, SOLUTION INTRAMUSCULAR; INTRAVENOUS at 08:17

## 2024-04-04 RX ADMIN — HYDROMORPHONE HYDROCHLORIDE 0.5 MG: 1 INJECTION, SOLUTION INTRAMUSCULAR; INTRAVENOUS; SUBCUTANEOUS at 09:59

## 2024-04-04 RX ADMIN — PROPOFOL 200 MG: 10 INJECTION, EMULSION INTRAVENOUS at 08:27

## 2024-04-04 RX ADMIN — ONDANSETRON 4 MG: 2 INJECTION INTRAMUSCULAR; INTRAVENOUS at 08:18

## 2024-04-04 RX ADMIN — PROPOFOL 100 MG: 10 INJECTION, EMULSION INTRAVENOUS at 08:39

## 2024-04-04 RX ADMIN — SUGAMMADEX 400 MG: 100 INJECTION, SOLUTION INTRAVENOUS at 09:19

## 2024-04-04 RX ADMIN — FENTANYL CITRATE 50 MCG: 50 INJECTION, SOLUTION INTRAMUSCULAR; INTRAVENOUS at 08:27

## 2024-04-04 RX ADMIN — METOPROLOL TARTRATE 2.5 MG: 5 INJECTION INTRAVENOUS at 08:50

## 2024-04-04 RX ADMIN — ROCURONIUM BROMIDE 50 MG: 10 INJECTION, SOLUTION INTRAVENOUS at 08:34

## 2024-04-04 RX ADMIN — MIDAZOLAM HYDROCHLORIDE 2 MG: 1 INJECTION, SOLUTION INTRAMUSCULAR; INTRAVENOUS at 08:17

## 2024-04-04 RX ADMIN — ONDANSETRON 4 MG: 2 INJECTION INTRAMUSCULAR; INTRAVENOUS at 09:18

## 2024-04-04 RX ADMIN — PROPOFOL 100 MG: 10 INJECTION, EMULSION INTRAVENOUS at 08:31

## 2024-04-04 RX ADMIN — CEFAZOLIN 3 G: 1 INJECTION, POWDER, FOR SOLUTION INTRAMUSCULAR; INTRAVENOUS at 08:44

## 2024-04-04 RX ADMIN — ROCURONIUM BROMIDE 50 MG: 10 INJECTION, SOLUTION INTRAVENOUS at 08:27

## 2024-04-04 SDOH — HEALTH STABILITY: MENTAL HEALTH: CURRENT SMOKER: 0

## 2024-04-04 ASSESSMENT — PAIN SCALES - GENERAL
PAINLEVEL_OUTOF10: 7
PAINLEVEL_OUTOF10: 7
PAINLEVEL_OUTOF10: 0 - NO PAIN

## 2024-04-04 ASSESSMENT — PAIN - FUNCTIONAL ASSESSMENT
PAIN_FUNCTIONAL_ASSESSMENT: 0-10
PAIN_FUNCTIONAL_ASSESSMENT: 0-10

## 2024-04-04 NOTE — ANESTHESIA PROCEDURE NOTES
Airway  Date/Time: 4/4/2024 8:34 AM  Urgency: elective    Airway not difficult    Staffing  Performed: CRNA   Authorized by: JOSELINE Lopez    Performed by: JOSELINE Lopez  Patient location during procedure: OR    Indications and Patient Condition  Indications for airway management: anesthesia  Spontaneous Ventilation: absent  Sedation level: deep  Preoxygenated: yes  Patient position: sniffing  Mask difficulty assessment: 2 - vent by mask + OA or adjuvant +/- NMBA    Final Airway Details  Final airway type: endotracheal airway      Successful airway: ETT  Cuffed: yes   Successful intubation technique: video laryngoscopy  Facilitating devices/methods: intubating stylet  Blade: Azam  Blade size: #4  ETT size (mm): 7.0  Cormack-Lehane Classification: grade I - full view of glottis  Placement verified by: chest auscultation and capnometry   Measured from: lips  ETT to lips (cm): 21  Number of attempts at approach: 1  Number of other approaches attempted: 0    Additional Comments  DL x2 with Christine, second attempt successful by Dr Castañeda

## 2024-04-04 NOTE — DISCHARGE INSTRUCTIONS
Wound Care  You may remove your dressing three days after surgery and apply dry gauze to wound sites to be changed daily until post-op visit.  Please do not remove steri-strips that are covering the incisions (small pieces of tape) If they are present.  Removing this tape may cause your incision to separate.  Please be certain to wash hands thoroughly prior to changing dressing.  Please do not use bacitracin or other ointments under the bandage.  MAKE SURE EACH INCISION IS COVERED WITH A WATERPROOF DRESSING DURING SHOWER ONLY, PLEASE DO NOT SOAK WOUND IN BATHTUB.  Gently pat the area dry after showering and use dry gauze over each incision to allow the incisions to dry & heal.       Crutches/Elevation of Leg  You should remain on crutches and place no weight on your operative leg until your anesthesia has completely worn off.  Anesthesia effects can last up until 48 hours after surgery and during this time there is a very high risk of falling, please have assistance with you whenever you are up and about.  Any weight on your leg during the first 48 hours may result in a fall, please be extremely careful.  When sitting or lying down, please keep your operative leg elevated, do not place anything directly under your knee, this may cause a slight bend in your knee and can lead to difficulty regaining full extension in your knee.     Weight bearing as tolerated - You may put as much weight on your leg as tolerated     Continuous icing will help to decrease swelling and provide pain relief.  You may use ice packs every 2 hours for 20 minutes daily until your first post-operative visit.  It is very important to always have protection between the ice pad and your skin.  Never place the ice pad directly on your skin; this could lead to an injury to your skin.       Driving  Please do not drive until you are evaluated in the office after surgery.  You are considered an impaired  following surgery, and if you choose to  drive, your insurance may not cover any accidents that may occur.     Post-operative Medication     1. Aspirin 325mg 1 tablet twice a day for 4 weeks following surgery.  Aspirin helps to reduce the risk of blood clots following surgery.       2. Colace 1 tablet twice a day.  Colace is used to prevent constipation while taking pain medication.    3.  Mobic 15 mg take 1 tablet daily for 5 days, after 5 days take 1 tablet daily only as needed for pain.     4.Tylenol 1gm every 8 hours for 5 days, after 5 days take 1gm every 8 hours only as needed for pain.     5.Gabapentin 300mg daily at bedtime for 5 days, after 5 days you should no longer need this medication.     6. Oxy IR 5mg every 6 hours ONLY TO BE TAKEN FOR SEVERE PAIN.  Do NOT take Oxy IR within 3 hours of taking Gabapentin. Do NOT take Gabapentin within 3 hours of taking Oxy IR. If you are having severe pain and taking Oxy IR at night skip the nightly Gabapentin and only resume when you are not taking Oxy IR at night     Signs and Symptoms of Complications  Although complications are rare the following are a list of symptoms you should be aware of.   Infection - increased pain not relieved with medication, fever, chills, redness, swelling or drainage from incision.  Blood Clot - swelling, tenderness, or pain to calf when you move your ankle up and down. If your calf is sore, please DO NOT massage calf.    If you experience chest pain, shortness of breath or difficulty breathing; please report to emergency room immediately.        If a follow-up visit after surgery was not scheduled, please call Dr. Rosa's office at [phone] during office business hours (Monday to Friday, 8:30am to 3:30pm) to arrange a follow-up appointment for 2 weeks after your surgery.    If you have any questions, please call Dr. Rosa's office at [phone] during office business hours (Monday to Friday, 8:30am to 3:30pm).  Please do not call Dr. Rosa's office after 3:30pm or on  weekends or holidays.  If you have an urgent issue after 3:30pm or on weekends or holidays, please go immediately to a local emergency room to be evaluated.

## 2024-04-04 NOTE — OP NOTE
LATERAL AND MEDICAL MENISCECTOMY ARTHROSCOPY KNEE, PATELLA FEMORAL MICROFRACTURE (R) Operative Note     Date: 2024  OR Location: [facility] OR    Name: Rhonda Pizano : 1980, Age: 43 y.o., MRN: [id number], Sex: female    ORTHOPEDIC OPERATIVE REPORT / POST-OP NOTE    SURGEON:  Phoenix Rosa MD  ASSISTANT(S):  Crista Gomez MD  PRE-OPERATIVE DIAGNOSIS: Right knee medial and lateral meniscus tears, chondral damage  POST-OPERATIVE DIAGNOSIS:  Same  PROCEDURE: Right knee arthroscopy, partial medial and lateral menisectomies, microfracture  CPT CODE(S):  83591, 79135  ANESTHESIA:  general + local  ESTIMATED BLOOD LOSS: Minimal  TOURNIQUET TIME: None  SPECIMEN:  None  FINDINGS:  Above  COMPLICATIONS:  None  CONDITION:  Stable to the Recovery Room    I, Dr Rosa, was present and scrubbed for the entire surgical procedure, including wound closure.         INDICATION FOR SURGERY:  43-year-old female with a BMI of 57 with longstanding right knee pain.  We treated the patient's knee pain conservatively with anti-inflammatories, physical therapy, and steroid injections.  The patient failed to have significant relief.  X-rays showed a relatively normal knee with no significant arthritis and MRI showed right knee medial and lateral meniscus tearing with cartilage damage.  On physical examination, the patient had joint line tenderness with a positive Prabha's test.  We discussed continuing conservative treatment but this had not been helping and was tried already.  We discussed a knee arthroscopy with partial menisectomy and possible microfracture.  The patient understood all the risks versus benefits of operative and non-operative treatment options.  The risks of knee arthroscopy were discussed, which included but were not limited to: risk of continued pain, risks of infection, bleeding, nerve, artery, or muscle damage, risk of fracture, risk of need for additional surgery, risk of anesthesia including risks of  heart attack, stroke, or even death.  The patient wanted to proceed with surgery and signed appropriate surgical consents.  On the morning of surgery, I signed the patient's operative knee the preoperative holding area.      PROCEDURE:  The patient was brought to the operating room and was placed supine on the operating room table.  All of their bony prominences were padded.  A operating room huddle was performed and the patient received IV antibiotics.  SCDs were applied to the non-operative lower extremity and a non-sterile tournaquet was placed around the operative proximal thigh.   The patient's right lower extremity was prepped and draped in the normal sterile fashion.  A pre-incision timeout was called.  A knee arthroscopy was then performed, using the standard anteromedial and anterolateral portals.   Throughout the knee arthroscopy, no loose bodies were identified.     In the patellofemoral joint (patella / trochlea), the cartilage was found to be intact on the undersurface the patella but had an area of grade 4 cartilage damage in the central aspect of the trochlea.  The damage cartilage in the trochlea was debrided with an arthroscopic shaver back to stable base and a microfracture was performed of the trochlea drilling holes 3 to 4 mm deep and 3 to 4 mm apart.  After this was performed good blood flow seen coming from the microfracture holes in the trochlea.     In the medial compartment, there was a complex medial meniscus tear in the body of the medial meniscus extending back to the meniscocapsular junction that was debrided using a combination of arthroscopic diane and biters, debriding the tear back to a stable base.  After this was performed, the remainder of medial meniscus was probed and found to be stable.  The cartilage of the medial compartment was found to have some grade 2 cartilage changes that was debrided arthroscopic shaver back to stable base.    In the notch, the ACL and PCL were found  to be intact and had good, appropriate tension.      In the lateral compartment, the lateral meniscus was found to have central tearing of the lateral meniscus.  The lateral meniscus tear was debrided using combination of arthroscopic diane biters back to stable base.  After this was performed medial lateral meniscus was probed and found to be stable.  The cartilage of the lateral compartment was relatively well-maintained.    After the knee arthroscopy was completed, a complete knee arthroscopy was then again performed and no further pathology was identified.  The knee was drained of fluid and the arthroscopic portals were closed with 3-0 nylon skin sutures.  Adaptic and a dry sterile dressing was applied.  The patient was awoken and brought to the recovery room in good condition.  There were no complications.

## 2024-04-04 NOTE — BRIEF OP NOTE
Date: 2024  OR Location: [facility] OR    Name: Rhonda Pizano, : 1980, Age: 43 y.o., MRN: [id number], Sex: female    Diagnosis  Pre-op Diagnosis     * Acute medial meniscus tear of right knee, initial encounter [S83.241A]     * Chondromalacia, knee, right [M94.261] Post-op Diagnosis     * Acute medial meniscus tear of right knee, initial encounter [S83.241A]     * Chondromalacia, knee, right [M94.261]     Procedures  LATERAL AND MEDICAL MENISCECTOMY ARTHROSCOPY KNEE, PATELLA FEMORAL MICROFRACTURE  [id number] - [place] ARTHRS KNE SURG W/MENISCECTOMY MED/LAT W/SHVG    [place] ARTHRS KNEE ABRASION ARTHRP/MLT DRLG/MICROFX [30161]  Surgeons      * SYMONE Rosa - Primary    Resident/Fellow/Other Assistant:  Surgeon(s) and Role:    Procedure Summary  Anesthesia: General  ASA: III  Anesthesia Staff: CRNA: GRISELDA Lopez-CRNA  Estimated Blood Loss: 5mL  Intra-op Medications:   Administrations occurring from 0845 to 1000 on 24:   Medication Name Total Dose   lactated Ringer's infusion Cannot be calculated              Anesthesia Record               Intraprocedure I/O Totals          Intake    lactated Ringer's infusion 600.00 mL    Total Intake 600 mL          Specimen: No specimens collected     Staff:   Circulator: Anisa Aburto RN  Scrub Person: Mo Martinez          Findings: medial and lateral meniscus tears    Complications:  None; patient tolerated the procedure well.     Disposition: PACU - hemodynamically stable.  Condition: stable  Specimens Collected: No specimens collected    Crista Gomez MD

## 2024-04-04 NOTE — ANESTHESIA PREPROCEDURE EVALUATION
Patient: Rhonda Pizano    Procedure Information       Date/Time: 04/04/24 0845    Procedure: MENISCECTOMY ARTHROSCOPY KNEE (Right: Knee)    Location: [facility] OR [facility] / [facility]    Surgeons: SYMONE Rosa MD            Relevant Problems   Cardiac   (+) Benign essential HTN   (+) HTN (hypertension)      Pulmonary   (+) Asthma   (+) LISE (obstructive sleep apnea)      Neuro   (+) Depression      GI   (+) GERD without esophagitis      Endocrine   (+) Hypothyroidism   (+) Morbid obesity (CMS/HCC)      Hematology   (+) Pancytopenia (CMS/HCC)      Musculoskeletal   (+) Chondromalacia, knee, right   (+) Triangular fibrocartilage complex injury, left, initial encounter      ID   (+) COVID-19   (+) Strep pharyngitis   (+) Upper respiratory tract infection       Clinical information reviewed:       Med Hx             NPO Detail:  No data recorded     Physical Exam    Airway  Mallampati: III  TM distance: >3 FB  Neck ROM: full     Cardiovascular - normal exam     Dental    Pulmonary - normal exam     Abdominal - normal exam           Anesthesia Plan    History of general anesthesia?: yes  History of complications of general anesthesia?: no    ASA 3     general     The patient is not a current smoker.    intravenous induction   Postoperative administration of opioids is intended.  Trial extubation is planned.  Anesthetic plan and risks discussed with patient.  Use of blood products discussed with patient who.    Plan discussed with CRNA and attending.

## 2024-04-08 DIAGNOSIS — Z87.828 S/P ARTHROSCOPIC PARTIAL LATERAL MENISCECTOMY OF RIGHT KNEE: ICD-10-CM

## 2024-04-08 DIAGNOSIS — Z98.890 S/P ARTHROSCOPIC PARTIAL LATERAL MENISCECTOMY OF RIGHT KNEE: ICD-10-CM

## 2024-04-08 DIAGNOSIS — Z98.890 S/P ARTHROSCOPIC PARTIAL MEDIAL MENISCECTOMY OF RIGHT KNEE: ICD-10-CM

## 2024-04-08 DIAGNOSIS — Z87.828 S/P ARTHROSCOPIC PARTIAL MEDIAL MENISCECTOMY OF RIGHT KNEE: ICD-10-CM

## 2024-04-17 ENCOUNTER — OFFICE VISIT (OUTPATIENT)
Dept: ORTHOPEDIC SURGERY | Facility: CLINIC | Age: 44
End: 2024-04-17
Payer: COMMERCIAL

## 2024-04-17 VITALS — BODY MASS INDEX: 56.49 KG/M2 | WEIGHT: 293 LBS

## 2024-04-17 DIAGNOSIS — S83.241A ACUTE MEDIAL MENISCUS TEAR OF RIGHT KNEE, INITIAL ENCOUNTER: Primary | ICD-10-CM

## 2024-04-17 PROCEDURE — 1036F TOBACCO NON-USER: CPT | Performed by: PHYSICIAN ASSISTANT

## 2024-04-17 PROCEDURE — 99024 POSTOP FOLLOW-UP VISIT: CPT | Performed by: PHYSICIAN ASSISTANT

## 2024-04-17 ASSESSMENT — PAIN SCALES - GENERAL: PAINLEVEL_OUTOF10: 2

## 2024-04-17 ASSESSMENT — PAIN - FUNCTIONAL ASSESSMENT: PAIN_FUNCTIONAL_ASSESSMENT: 0-10

## 2024-04-17 ASSESSMENT — PAIN DESCRIPTION - DESCRIPTORS: DESCRIPTORS: TIGHTNESS

## 2024-04-17 NOTE — LETTER
April 17, 2024     Patient: Rhonda Pizano   YOB: 1980   Date of Visit: 4/17/2024       To Whom It May Concern:    It is my medical opinion that Rhonda Pizano should remain out of work until 5/13/24 full duty .    If you have any questions or concerns, please don't hesitate to call.         Sincerely,        Rosalba Jansen PA-C    CC: No Recipients

## 2024-04-19 ENCOUNTER — TELEPHONE (OUTPATIENT)
Dept: ORTHOPEDIC SURGERY | Facility: CLINIC | Age: 44
End: 2024-04-19
Payer: COMMERCIAL

## 2024-04-19 NOTE — TELEPHONE ENCOUNTER
4/4/24 RT KNEE SCOPE, LAT & MEDIAL MENISCECTOMY, PATELLA FEMORAL MICRO FX  Patient gave FMLA paperwork to [employer] on Wednesday in clinic when she saw Rosalba. She stated the paperwork would be completed and she can . Did she give this to you?

## 2024-04-19 NOTE — TELEPHONE ENCOUNTER
Let patient know next week once paperwork is completed someone will reach out to let her know so she can  here at [facility].

## 2024-04-22 ENCOUNTER — OFFICE VISIT (OUTPATIENT)
Dept: PRIMARY CARE | Facility: CLINIC | Age: 44
End: 2024-04-22
Payer: COMMERCIAL

## 2024-04-22 VITALS
SYSTOLIC BLOOD PRESSURE: 140 MMHG | HEART RATE: 83 BPM | BODY MASS INDEX: 57.78 KG/M2 | TEMPERATURE: 98.5 F | DIASTOLIC BLOOD PRESSURE: 80 MMHG | OXYGEN SATURATION: 96 % | WEIGHT: 293 LBS

## 2024-04-22 DIAGNOSIS — T78.2XXS ANAPHYLAXIS, SEQUELA: Primary | ICD-10-CM

## 2024-04-22 PROCEDURE — 3079F DIAST BP 80-89 MM HG: CPT

## 2024-04-22 PROCEDURE — 3077F SYST BP >= 140 MM HG: CPT

## 2024-04-22 PROCEDURE — 99214 OFFICE O/P EST MOD 30 MIN: CPT

## 2024-04-22 ASSESSMENT — ENCOUNTER SYMPTOMS
ACTIVITY CHANGE: 0
ALLERGIC/IMMUNOLOGIC NEGATIVE: 1
FATIGUE: 1
GASTROINTESTINAL NEGATIVE: 1
PSYCHIATRIC NEGATIVE: 1
WEAKNESS: 0
MUSCULOSKELETAL NEGATIVE: 1
DIZZINESS: 0
RESPIRATORY NEGATIVE: 1
FEVER: 0
CARDIOVASCULAR NEGATIVE: 1
HEADACHES: 0
ABDOMINAL PAIN: 0
SHORTNESS OF BREATH: 0
UNEXPECTED WEIGHT CHANGE: 0
ENDOCRINE NEGATIVE: 1

## 2024-04-22 NOTE — PATIENT INSTRUCTIONS
Prozac taper:   Take every other day for 2 weeks  3x per week for 1 week, then STOP    Thank you for coming in today, if any questions or concerns arise, please call my office.   GRISELDA Ambrosio-CNP

## 2024-04-22 NOTE — PROGRESS NOTES
Subjective   Patient ID: Rhonda Pizano is a 43 y.o. female who presents for Follow-up (Rhonda is here for a medication discussion on weight loss. Would also like to discuss stopping Prozac. ).    Patient presents for recheck and to discuss weight loss  Also discussed stopping SSRI, she has been on Synthroid therapy for years    We discussed safe tapering of Prozac which includes taking every other day for 2 weeks and every third day for 1 more week  --We discussed how side effect of Prozac includes appetite increase and weight gain    -- She is working on starting weight loss medication which is a dietary supplement which includes L-theanine and caffeine  --Recommend to start this and monitor in 6 months        Vitals:    04/22/24 1026   BP: 140/80   Pulse: 83   Temp: 36.9 °C (98.5 °F)   SpO2: 96%       Review of Systems   Constitutional:  Positive for fatigue. Negative for activity change, fever and unexpected weight change.   HENT: Negative.     Respiratory: Negative.  Negative for shortness of breath.    Cardiovascular: Negative.  Negative for chest pain.   Gastrointestinal: Negative.  Negative for abdominal pain.   Endocrine: Negative.    Musculoskeletal: Negative.    Skin: Negative.    Allergic/Immunologic: Negative.    Neurological:  Negative for dizziness, weakness and headaches.   Psychiatric/Behavioral: Negative.         Objective   Physical Exam  Vitals and nursing note reviewed.   Constitutional:       General: She is not in acute distress.     Appearance: Normal appearance. She is not ill-appearing, toxic-appearing or diaphoretic.         Assessment/Plan   Problem List Items Addressed This Visit    None  Visit Diagnoses       Anaphylaxis, sequela    -  Primary    Relevant Medications    EPINEPHrine (ADRENALIN) 1 mg/mL kit                 Thank you for coming in today, please call my office if you have any concerns or questions.     Clem VILLALOBOS, CNP

## 2024-04-22 NOTE — TELEPHONE ENCOUNTER
Left message for patient asking if I can move her appointment to a post op patient slot at 2:30 instead of 3

## 2024-05-29 ENCOUNTER — APPOINTMENT (OUTPATIENT)
Dept: ORTHOPEDIC SURGERY | Facility: CLINIC | Age: 44
End: 2024-05-29
Payer: COMMERCIAL

## 2024-06-24 NOTE — ANESTHESIA POSTPROCEDURE EVALUATION
Patient: Rhonda iPzano    Procedure Summary       Date: 04/04/24 Room / Location: [facility] / [facility]    Anesthesia Start: 0821 Anesthesia Stop: 0937    Procedure: LATERAL AND MEDICAL MENISCECTOMY ARTHROSCOPY KNEE, PATELLA FEMORAL MICROFRACTURE (Right: Knee) Diagnosis:       Acute medial meniscus tear of right knee, initial encounter      Chondromalacia, knee, right      (Acute medial meniscus tear of right knee, initial encounter [S83.241A])      (Chondromalacia, knee, right [M94.261])    Surgeons: SYMONE Rosa MD Responsible Provider: JOSELINE Lopez    Anesthesia Type: general ASA Status: 3            Anesthesia Type: general    Vitals Value Taken Time   /61 04/04/24 1003   Temp 36.5 °C (97.7 °F) 04/04/24 0933   Pulse 71 04/04/24 1003   Resp 16 04/04/24 1003   SpO2 96 % 04/04/24 1003       Anesthesia Post Evaluation    Patient participation: complete - patient participated  Level of consciousness: awake and alert  Pain management: adequate  Airway patency: patent  Cardiovascular status: acceptable  Respiratory status: acceptable  Hydration status: acceptable  Postoperative Nausea and Vomiting: none        No notable events documented.

## 2024-07-26 ENCOUNTER — APPOINTMENT (OUTPATIENT)
Dept: PRIMARY CARE | Facility: CLINIC | Age: 44
End: 2024-07-26
Payer: COMMERCIAL

## 2024-07-26 VITALS
OXYGEN SATURATION: 97 % | TEMPERATURE: 97 F | SYSTOLIC BLOOD PRESSURE: 138 MMHG | DIASTOLIC BLOOD PRESSURE: 80 MMHG | BODY MASS INDEX: 57.78 KG/M2 | WEIGHT: 293 LBS | HEART RATE: 102 BPM

## 2024-07-26 DIAGNOSIS — I10 PRIMARY HYPERTENSION: Primary | ICD-10-CM

## 2024-07-26 DIAGNOSIS — R60.0 LOWER EXTREMITY EDEMA: ICD-10-CM

## 2024-07-26 PROCEDURE — 3075F SYST BP GE 130 - 139MM HG: CPT

## 2024-07-26 PROCEDURE — 3079F DIAST BP 80-89 MM HG: CPT

## 2024-07-26 PROCEDURE — 99213 OFFICE O/P EST LOW 20 MIN: CPT

## 2024-07-26 RX ORDER — HYDROCHLOROTHIAZIDE 12.5 MG/1
12.5 TABLET ORAL DAILY
Qty: 30 TABLET | Refills: 0 | Status: SHIPPED | OUTPATIENT
Start: 2024-07-26 | End: 2024-07-26

## 2024-07-26 RX ORDER — HYDROCHLOROTHIAZIDE 12.5 MG/1
12.5 TABLET ORAL DAILY
Qty: 30 TABLET | Refills: 0 | Status: SHIPPED | OUTPATIENT
Start: 2024-07-26 | End: 2024-08-25

## 2024-07-26 NOTE — PATIENT INSTRUCTIONS
Start hydro  See in 1 month follow up BP    Consider Magnesium Glycinate 240mg at night.     Thank you for coming in today, if any questions or concerns arise, please call my office.   GRISELDA Ambrosio-CNP

## 2024-07-26 NOTE — PROGRESS NOTES
Subjective   Patient ID: Rhonda Pizano is a 43 y.o. female who presents for Follow-up (Rhonda is here for ankle swelling for atleast a month. They are very painful.  During PT they advised her tendon was inflammed. When she is on the all day they swell and in the mornings they hurt. ).  Hypertension  Patient is here for follow-up of elevated blood pressure. She is exercising and is adherent to a low-salt diet. Blood pressure is not well controlled at home. Cardiac symptoms: leg swelling. Patient denies chest pain, claudication, irregular heart beat, and lower extremity edema. Cardiovascular risk factors: family history of premature cardiovascular disease, hypertension, obesity (BMI >= 30 kg/m2), and sedentary lifestyle. Use of agents associated with hypertension: none. History of target organ damage: none.    Trying to exercise, healthier diet and minimizing sodium intake  BLE pitting 1+  She has been off hydrochlorothiazide for 2 years.        Vitals:    07/26/24 1540   BP: 138/80   Pulse: 102   Temp: 36.1 °C (97 °F)   SpO2: 97%       Review of Systems    Objective   Physical Exam    Assessment/Plan   Problem List Items Addressed This Visit       HTN (hypertension) - Primary    Relevant Medications    hydroCHLOROthiazide (Microzide) 12.5 mg tablet     Other Visit Diagnoses       Lower extremity edema                     Thank you for coming in today, please call my office if you have any concerns or questions.     Clem VILLALOBOS, CNP

## 2024-08-14 DIAGNOSIS — K21.9 GERD WITHOUT ESOPHAGITIS: ICD-10-CM

## 2024-08-14 DIAGNOSIS — I10 PRIMARY HYPERTENSION: ICD-10-CM

## 2024-08-15 RX ORDER — OMEPRAZOLE 20 MG/1
20 CAPSULE, DELAYED RELEASE ORAL
Qty: 90 CAPSULE | Refills: 0 | Status: SHIPPED | OUTPATIENT
Start: 2024-08-15

## 2024-08-15 RX ORDER — LOSARTAN POTASSIUM 50 MG/1
50 TABLET ORAL DAILY
Qty: 90 TABLET | Refills: 0 | Status: SHIPPED | OUTPATIENT
Start: 2024-08-15

## 2024-08-22 DIAGNOSIS — I10 PRIMARY HYPERTENSION: ICD-10-CM

## 2024-08-23 RX ORDER — HYDROCHLOROTHIAZIDE 12.5 MG/1
12.5 TABLET ORAL DAILY
Qty: 30 TABLET | Refills: 0 | Status: SHIPPED | OUTPATIENT
Start: 2024-08-23

## 2024-08-26 ENCOUNTER — APPOINTMENT (OUTPATIENT)
Dept: PRIMARY CARE | Facility: CLINIC | Age: 44
End: 2024-08-26
Payer: COMMERCIAL

## 2024-08-26 VITALS
WEIGHT: 293 LBS | DIASTOLIC BLOOD PRESSURE: 90 MMHG | OXYGEN SATURATION: 97 % | TEMPERATURE: 96.7 F | HEART RATE: 112 BPM | BODY MASS INDEX: 56.49 KG/M2 | SYSTOLIC BLOOD PRESSURE: 130 MMHG

## 2024-08-26 DIAGNOSIS — I10 PRIMARY HYPERTENSION: ICD-10-CM

## 2024-08-26 PROCEDURE — 99214 OFFICE O/P EST MOD 30 MIN: CPT

## 2024-08-26 PROCEDURE — 3080F DIAST BP >= 90 MM HG: CPT

## 2024-08-26 PROCEDURE — 3075F SYST BP GE 130 - 139MM HG: CPT

## 2024-08-26 RX ORDER — HYDROCHLOROTHIAZIDE 12.5 MG/1
12.5 TABLET ORAL DAILY
Qty: 90 TABLET | Refills: 1 | Status: SHIPPED | OUTPATIENT
Start: 2024-08-26 | End: 2025-02-22

## 2024-08-26 ASSESSMENT — ENCOUNTER SYMPTOMS
ACTIVITY CHANGE: 0
SHORTNESS OF BREATH: 0
UNEXPECTED WEIGHT CHANGE: 0
CARDIOVASCULAR NEGATIVE: 1
GASTROINTESTINAL NEGATIVE: 1
RESPIRATORY NEGATIVE: 1
PSYCHIATRIC NEGATIVE: 1
FATIGUE: 0
ALLERGIC/IMMUNOLOGIC NEGATIVE: 1
HEADACHES: 0
HYPERTENSION: 1
WEAKNESS: 0
DIZZINESS: 0
ABDOMINAL PAIN: 0
MUSCULOSKELETAL NEGATIVE: 1
FEVER: 0
ENDOCRINE NEGATIVE: 1

## 2024-08-26 NOTE — PATIENT INSTRUCTIONS
Refill sent to [facility]  Check bp 3 months    Please cancel appt for October, we addressed Prozac today.    Thank you for coming in today, if any questions or concerns arise, please call my office.   GRISELDA Ambrosio-CNP

## 2024-08-26 NOTE — PROGRESS NOTES
Subjective   Patient ID: Rhonda Pizano is a 43 y.o. female who presents for Follow-up (1 month follow up) and Hypertension.  Hypertension  Patient is here for follow-up of elevated blood pressure. She is exercising and is adherent to a low-salt diet. Blood pressure not being checked at home. Cardiac symptoms: none. Patient denies chest pain, chest pressure/discomfort, dyspnea, fatigue, and near-syncope. Cardiovascular risk factors: obesity (BMI >= 30 kg/m2) and sedentary lifestyle. Use of agents associated with hypertension: none. History of target organ damage: none.        Hypertension  Pertinent negatives include no chest pain, headaches or shortness of breath.       Vitals:    08/26/24 1536   BP: 130/90   Pulse: (!) 112   Temp: 35.9 °C (96.7 °F)   SpO2: 97%       Review of Systems   Constitutional:  Negative for activity change, fatigue, fever and unexpected weight change.   HENT: Negative.     Respiratory: Negative.  Negative for shortness of breath.    Cardiovascular: Negative.  Negative for chest pain.   Gastrointestinal: Negative.  Negative for abdominal pain.   Endocrine: Negative.    Musculoskeletal: Negative.    Skin: Negative.    Allergic/Immunologic: Negative.    Neurological:  Negative for dizziness, weakness and headaches.   Psychiatric/Behavioral: Negative.         Objective   Physical Exam  Vitals and nursing note reviewed.   Constitutional:       Appearance: Normal appearance.   HENT:      Head: Normocephalic.      Mouth/Throat:      Mouth: Mucous membranes are moist.   Cardiovascular:      Rate and Rhythm: Normal rate and regular rhythm.      Pulses: Normal pulses.      Heart sounds: Normal heart sounds. No murmur heard.     No friction rub. No gallop.   Pulmonary:      Effort: Pulmonary effort is normal. No respiratory distress.      Breath sounds: Normal breath sounds. No wheezing.   Abdominal:      General: Bowel sounds are normal. There is no distension.      Palpations: Abdomen is soft.       Tenderness: There is no abdominal tenderness.   Musculoskeletal:         General: No deformity. Normal range of motion.   Skin:     General: Skin is warm and dry.      Capillary Refill: Capillary refill takes less than 2 seconds.   Neurological:      General: No focal deficit present.      Mental Status: She is alert and oriented to person, place, and time.   Psychiatric:         Mood and Affect: Mood normal.         Assessment/Plan   Problem List Items Addressed This Visit       HTN (hypertension)    Relevant Medications    hydroCHLOROthiazide (Microzide) 12.5 mg tablet            Thank you for coming in today, please call my office if you have any concerns or questions.     Clem VILLALOBOS, CNP

## 2024-09-25 ENCOUNTER — OFFICE VISIT (OUTPATIENT)
Dept: URGENT CARE | Facility: URGENT CARE | Age: 44
End: 2024-09-25
Payer: COMMERCIAL

## 2024-09-25 VITALS
OXYGEN SATURATION: 95 % | TEMPERATURE: 98.2 F | WEIGHT: 293 LBS | DIASTOLIC BLOOD PRESSURE: 108 MMHG | SYSTOLIC BLOOD PRESSURE: 166 MMHG | HEART RATE: 92 BPM | BODY MASS INDEX: 56.58 KG/M2 | RESPIRATION RATE: 18 BRPM

## 2024-09-25 DIAGNOSIS — J06.9 ACUTE UPPER RESPIRATORY INFECTION: Primary | ICD-10-CM

## 2024-09-25 DIAGNOSIS — R05.1 ACUTE COUGH: ICD-10-CM

## 2024-09-25 DIAGNOSIS — J02.9 SORE THROAT: ICD-10-CM

## 2024-09-25 LAB
POC RAPID STREP: NEGATIVE
POC SARS-COV-2 AG BINAX: NORMAL

## 2024-09-25 ASSESSMENT — ENCOUNTER SYMPTOMS
DIARRHEA: 0
SEIZURES: 0
CONSTITUTIONAL NEGATIVE: 1
VOMITING: 1
NEUROLOGICAL NEGATIVE: 1
COUGH: 0
BACK PAIN: 0
PALPITATIONS: 0
CARDIOVASCULAR NEGATIVE: 1
NECK PAIN: 0
ABDOMINAL PAIN: 0
HEADACHES: 0
MYALGIAS: 1
NERVOUS/ANXIOUS: 0
ENDOCRINE NEGATIVE: 1
SORE THROAT: 1
FATIGUE: 0
SHORTNESS OF BREATH: 0
DYSURIA: 0
EYES NEGATIVE: 1
RESPIRATORY NEGATIVE: 1

## 2024-09-25 NOTE — PROGRESS NOTES
Subjective   Patient ID: Rhonda Pizano is a 43 y.o. female. They present today with a chief complaint of Vomiting, Sore Throat, Generalized Body Aches, and Chills (3 days ).    History of Present Illness  Rhonda Pizano is a 43 y.o. female who presents to the  for a concern of sore throat, vomiting and body aches x 3 days.  She denies known sick contacts, and she has been using acetaminophen and Guaifenesin OTC for mild relief.  She states that she has not had a fever, and she denies rash.  She has not had known sick contact.  She denies diabetes.      Vomiting  Associated symptoms: myalgias and sore throat    Associated symptoms: no abdominal pain, no cough, no diarrhea and no headaches    Sore Throat   Associated symptoms include vomiting. Pertinent negatives include no abdominal pain, congestion, coughing, diarrhea, ear pain, headaches, neck pain or shortness of breath.       Past Medical History  Allergies as of 09/25/2024 - Reviewed 09/25/2024   Allergen Reaction Noted    Bee venom protein (honey bee) Anaphylaxis 04/20/2023    Lisinopril Other and Cough 10/09/2017    Amlodipine Unknown 04/20/2023    Fluticasone propion-salmeterol Unknown 04/20/2023    Losartan Wheezing 04/20/2023       (Not in a hospital admission)       Past Medical History:   Diagnosis Date    Acute medial meniscal injury of knee, right, initial encounter     Anemia     JEANETTE    Anxiety     Asthma ([facility])     Depression     GERD (gastroesophageal reflux disease)     Hypertension     Obesity     Wears eyeglasses        No past surgical history on file.     reports that she quit smoking about 30 years ago. Her smoking use included cigarettes. She has never used smokeless tobacco. She reports that she does not currently use alcohol. She reports that she does not currently use drugs.    Review of Systems  Review of Systems   Constitutional: Negative.  Negative for fatigue.   HENT:  Positive for sore throat. Negative for congestion, dental  problem and ear pain.    Eyes: Negative.    Respiratory: Negative.  Negative for cough and shortness of breath.    Cardiovascular: Negative.  Negative for chest pain, palpitations and leg swelling.   Gastrointestinal:  Positive for vomiting. Negative for abdominal pain and diarrhea.   Endocrine: Negative.    Genitourinary: Negative.  Negative for dysuria.   Musculoskeletal:  Positive for myalgias. Negative for back pain and neck pain.   Skin: Negative.  Negative for rash.   Neurological: Negative.  Negative for seizures and headaches.   Psychiatric/Behavioral:  Negative for behavioral problems. The patient is not nervous/anxious.        Objective    Vitals:    09/25/24 1620   BP: (!) 166/108   BP Location: Left arm   Patient Position: Sitting   Pulse: 92   Resp: 18   Temp: 36.8 °C (98.2 °F)   TempSrc: Oral   SpO2: 95%   Weight: (!) 159 kg (350 lb 8.5 oz)     No LMP recorded. Patient has had a hysterectomy.    Physical Exam    Procedures    Point of Care Test & Imaging Results from this visit  Results for orders placed or performed in visit on 09/25/24   POCT Covid-19 Rapid Antigen   Result Value Ref Range    POC KIRIT-COV-2 AG  Presumptive negative test for SARS-CoV-2 (no antigen detected)     Presumptive negative test for SARS-CoV-2 (no antigen detected)   POCT rapid strep A manually resulted   Result Value Ref Range    POC Rapid Strep Negative Negative      No results found.    Diagnostic study results (if any) were reviewed by Brianna Ward DO.    Assessment/Plan   Allergies, medications, history, and pertinent labs/EKGs/Imaging reviewed by Brianna Ward DO.     Orders and Diagnoses  Diagnoses and all orders for this visit:  Acute upper respiratory infection  Sore throat  -     POCT Covid-19 Rapid Antigen  -     POCT rapid strep A manually resulted  Acute cough  -     POCT Covid-19 Rapid Antigen    Patient Instructions   Testing Done:   Rapid Strep - Negative  Rapid COVID - Negative    Upper Respiratory Infection  Treatment includes multiple support treatments:  1) Fluids are essential to assist body's ability to heal itself.  This has been shown to shorten any infection.  - Humidifier/Vaporizer - can be helpful is air is dry, since the air will take fluid away from the body  - Drink Fluids - a lot of them.  Water is best, but can flavor it.  Also water down juice/sport drinks or teas.   When hungry - eat foods that have a lot of fluid and avoid most of the dry or greasy meals.     - Soups, yogurts, fruits and other fluid giving foods can be helpful.   - Eat small amounts frequently when hungry rather than larger amounts, since gut may be tender after being sick    2) Inflammation:  - Ibuprofen (Advil,Motrin) - for inflammation, congestion, headache, body aches.     - Do NOT use if using a blood thinner, have Stomach ulcer or Kidney disease    (May use with Mucinex brand as long as the active ingredients don't have ibuprofen listed.)  - Allergic inflammation (any drainage or congesiton from irritants or allergens)    - May be treated with cetirizine (Zyrtec) or like once a day non drowsy medication.      - Start or keep taking while having viral illness, since it may not be the main cause, this inflammation can contribute and make the symptoms worse.  - Acetaminophen (Tylenol) - for congestion, headache and body aches.      (Often already in multisymptom medications, so check active ingredients on OTC medications before adding more)    3) Drainage/Phlegm:  - Guaifenesin (Mucinex, Robitussin) - for congestion, mucous, cough  - Vicks vapor rub - may be use on feet or chest, cover so child doesn't eat the ointment.    (May use in Vaporizer/humidifier - if design allows)  - Nasal saline to rinse the nose - spray or rinses.    4) Restore/Maintain gut and body's balance  - Probiotic - eating yogurt and/or taking a supplement can help the body tolerate the medication and regain balance while and after being sick        Medical Admin  Record      Patient disposition: Home    Electronically signed by Brianna Ward DO  6:25 PM

## 2024-09-25 NOTE — PATIENT INSTRUCTIONS
Testing Done:   Rapid Strep - Negative  Rapid COVID - Negative    Upper Respiratory Infection Treatment includes multiple support treatments:  1) Fluids are essential to assist body's ability to heal itself.  This has been shown to shorten any infection.  - Humidifier/Vaporizer - can be helpful is air is dry, since the air will take fluid away from the body  - Drink Fluids - a lot of them.  Water is best, but can flavor it.  Also water down juice/sport drinks or teas.   When hungry - eat foods that have a lot of fluid and avoid most of the dry or greasy meals.     - Soups, yogurts, fruits and other fluid giving foods can be helpful.   - Eat small amounts frequently when hungry rather than larger amounts, since gut may be tender after being sick    2) Inflammation:  - Ibuprofen (Advil,Motrin) - for inflammation, congestion, headache, body aches.     - Do NOT use if using a blood thinner, have Stomach ulcer or Kidney disease    (May use with Mucinex brand as long as the active ingredients don't have ibuprofen listed.)  - Allergic inflammation (any drainage or congesiton from irritants or allergens)    - May be treated with cetirizine (Zyrtec) or like once a day non drowsy medication.      - Start or keep taking while having viral illness, since it may not be the main cause, this inflammation can contribute and make the symptoms worse.  - Acetaminophen (Tylenol) - for congestion, headache and body aches.      (Often already in multisymptom medications, so check active ingredients on OTC medications before adding more)    3) Drainage/Phlegm:  - Guaifenesin (Mucinex, Robitussin) - for congestion, mucous, cough  - Vicks vapor rub - may be use on feet or chest, cover so child doesn't eat the ointment.    (May use in Vaporizer/humidifier - if design allows)  - Nasal saline to rinse the nose - spray or rinses.    4) Restore/Maintain gut and body's balance  - Probiotic - eating yogurt and/or taking a supplement can help the  body tolerate the medication and regain balance while and after being sick

## 2024-09-29 ENCOUNTER — APPOINTMENT (OUTPATIENT)
Dept: RADIOLOGY | Facility: HOSPITAL | Age: 44
End: 2024-09-29
Payer: COMMERCIAL

## 2024-09-29 ENCOUNTER — APPOINTMENT (OUTPATIENT)
Dept: PRIMARY CARE | Facility: CLINIC | Age: 44
End: 2024-09-29
Payer: COMMERCIAL

## 2024-09-29 ENCOUNTER — HOSPITAL ENCOUNTER (EMERGENCY)
Facility: HOSPITAL | Age: 44
Discharge: HOME | End: 2024-09-29
Attending: FAMILY MEDICINE
Payer: COMMERCIAL

## 2024-09-29 VITALS
HEIGHT: 66 IN | SYSTOLIC BLOOD PRESSURE: 143 MMHG | HEART RATE: 81 BPM | WEIGHT: 293 LBS | RESPIRATION RATE: 18 BRPM | DIASTOLIC BLOOD PRESSURE: 92 MMHG | BODY MASS INDEX: 47.09 KG/M2 | OXYGEN SATURATION: 98 % | TEMPERATURE: 97 F

## 2024-09-29 DIAGNOSIS — B34.9 VIRAL ILLNESS: Primary | ICD-10-CM

## 2024-09-29 LAB
FLUAV RNA RESP QL NAA+PROBE: NOT DETECTED
FLUBV RNA RESP QL NAA+PROBE: NOT DETECTED
S PYO DNA THROAT QL NAA+PROBE: NOT DETECTED
SARS-COV-2 RNA RESP QL NAA+PROBE: NOT DETECTED

## 2024-09-29 PROCEDURE — 99283 EMERGENCY DEPT VISIT LOW MDM: CPT

## 2024-09-29 PROCEDURE — 87651 STREP A DNA AMP PROBE: CPT | Performed by: FAMILY MEDICINE

## 2024-09-29 PROCEDURE — 87636 SARSCOV2 & INF A&B AMP PRB: CPT | Performed by: FAMILY MEDICINE

## 2024-09-29 PROCEDURE — 71045 X-RAY EXAM CHEST 1 VIEW: CPT

## 2024-09-29 PROCEDURE — 71045 X-RAY EXAM CHEST 1 VIEW: CPT | Mod: FOREIGN READ | Performed by: RADIOLOGY

## 2024-09-29 RX ORDER — BENZONATATE 100 MG/1
100 CAPSULE ORAL EVERY 8 HOURS
Qty: 21 CAPSULE | Refills: 0 | Status: SHIPPED | OUTPATIENT
Start: 2024-09-29 | End: 2024-10-06

## 2024-09-29 RX ORDER — BENZONATATE 100 MG/1
100 CAPSULE ORAL EVERY 8 HOURS
Qty: 21 CAPSULE | Refills: 0 | Status: SHIPPED | OUTPATIENT
Start: 2024-09-29 | End: 2024-09-29

## 2024-09-29 ASSESSMENT — PAIN DESCRIPTION - PAIN TYPE: TYPE: ACUTE PAIN

## 2024-09-29 ASSESSMENT — PAIN - FUNCTIONAL ASSESSMENT: PAIN_FUNCTIONAL_ASSESSMENT: 0-10

## 2024-09-29 ASSESSMENT — PAIN DESCRIPTION - DESCRIPTORS: DESCRIPTORS: BURNING;ACHING

## 2024-09-29 ASSESSMENT — PAIN DESCRIPTION - LOCATION: LOCATION: THROAT

## 2024-09-29 ASSESSMENT — PAIN SCALES - GENERAL: PAINLEVEL_OUTOF10: 6

## 2024-09-29 ASSESSMENT — PAIN DESCRIPTION - ORIENTATION: ORIENTATION: RIGHT;LEFT

## 2024-09-29 NOTE — ED PROVIDER NOTES
HPI   Chief Complaint   Patient presents with    Flu Symptoms       43-year-old female comes the ED with complaint of persistent flulike symptoms for the last several days.  Patient reports he went to urgent care and was tested for COVID and strep and they are found to be negative.  Patient went home and states she started using some over-the-counter medications but the symptoms continue to persist and she had a more aggressive cough today and came to the ED to get checked out.  Patient in the ED is alert, cooperative, appears uncomfortable, but in no distress.  Patient reports he continues have body aches, nasal congestion, cough, and earache.  She reports no other associate symptoms or complaints.      History provided by:  Patient and medical records   used: No            Patient History   Past Medical History:   Diagnosis Date    Acute medial meniscal injury of knee, right, initial encounter     Anemia     JEANETTE    Anxiety     Asthma ([facility])     Depression     GERD (gastroesophageal reflux disease)     Hypertension     Obesity     Wears eyeglasses      History reviewed. No pertinent surgical history.  No family history on file.  Social History     Tobacco Use    Smoking status: Former     Current packs/day: 0.00     Types: Cigarettes     Quit date:      Years since quittin.    Smokeless tobacco: Never   Vaping Use    Vaping status: Never Used   Substance Use Topics    Alcohol use: Not Currently    Drug use: Not Currently       Physical Exam   ED Triage Vitals   Temperature Heart Rate Respirations BP   24 1031 24 1031 24 1049 24 1031   36.7 °C (98.1 °F) 81 18 (!) 164/106      SpO2 Temp src Heart Rate Source Patient Position   24 1031 -- -- --   98 %         BP Location FiO2 (%)     24 1031 --     Left arm        Physical Exam  Vitals and nursing note reviewed.   Constitutional:       General: She is not in acute distress.     Appearance: She is  well-developed.   HENT:      Head: Normocephalic and atraumatic.      Right Ear: Tympanic membrane and ear canal normal.      Left Ear: Tympanic membrane and ear canal normal.      Nose: Mucosal edema, congestion and rhinorrhea present. Rhinorrhea is clear.      Mouth/Throat:      Lips: Pink.      Mouth: Mucous membranes are moist.      Pharynx: Oropharynx is clear. Uvula midline.   Eyes:      Conjunctiva/sclera: Conjunctivae normal.   Cardiovascular:      Rate and Rhythm: Normal rate and regular rhythm.      Pulses: Normal pulses.      Heart sounds: Normal heart sounds, S1 normal and S2 normal. No murmur heard.  Pulmonary:      Effort: Pulmonary effort is normal. No respiratory distress.      Breath sounds: Normal breath sounds.   Abdominal:      Palpations: Abdomen is soft.      Tenderness: There is no abdominal tenderness.   Musculoskeletal:         General: No swelling.      Cervical back: Neck supple.   Skin:     General: Skin is warm and dry.      Capillary Refill: Capillary refill takes less than 2 seconds.   Neurological:      Mental Status: She is alert.   Psychiatric:         Mood and Affect: Mood normal.           ED Course & MDM   Diagnoses as of 09/29/24 1718   Viral illness                 No data recorded     [place] Coma Scale Score: 15 (09/29/24 1050 : Bess Tong RN)                       Labs Reviewed   GROUP A STREPTOCOCCUS, PCR - Normal       Result Value    Group A Strep PCR Not Detected     INFLUENZA A AND B PCR - Normal    Flu A Result Not Detected      Flu B Result Not Detected      Narrative:     This assay is an in vitro diagnostic multiplex nucleic acid amplification test for the detection and discrimination of Influenza A & B from nasopharyngeal specimens, and has been validated for use at [facility]. Negative results do not preclude Influenza A/B infections, and should not be used as the sole basis for diagnosis, treatment, or other management decisions. If  Influenza A/B and RSV PCR results are negative, testing for Parainfluenza virus, Adenovirus and Metapneumovirus is routinely performed for [facility] pediatric oncology and intensive care inpatients, and is available on other patients by placing an add-on request.   SARS-COV-2 PCR - Normal    Coronavirus 2019, PCR Not Detected      Narrative:     This assay has received FDA Emergency Use Authorization (EUA) and is only authorized for the duration of time that circumstances exist to justify the authorization of the emergency use of in vitro diagnostic tests for the detection of SARS-CoV-2 virus and/or diagnosis of COVID-19 infection under section 564(b)(1) of the Act, 21 U.S.C. 360bbb-3(b)(1). This assay is an in vitro diagnostic nucleic acid amplification test for the qualitative detection of SARS-CoV-2 from nasopharyngeal specimens and has been validated for use at [facility]. Negative results do not preclude COVID-19 infections and should not be used as the sole basis for diagnosis, treatment, or other management decisions.       XR chest 1 view   Final Result   No acute cardiopulmonary disease..   Signed by Dennis Jacome MD            Medical Decision Making  Pt upon arrival to the ED appeared to be uncomfortable but in no distress with stable vitals.  Discussed with pt/family the presenting complaints and clinically findings.  Reviewed with them the epic chart and counseled them on flu like symptoms and appropriate approach to management/treatments.  After assessment and evaluation viral swab sent, strep swab performed, imaging ordered, and patient observed.  For period of time patient was reassessed and continued to be unchanged with stable vital signs and final results were reviewed/cussed with patient/family.  At this time findings appear to be negative for strep or COVID/influenza and patient was educated other viral etiologies causing flulike symptoms.  Patient is also educated on use of all  counter medications for management/supportive care and educated the patient on use of Tessalon Perles.  Patient was encouraged to contact her primary care doctor for follow-up recheck in several days.  Patient stable and discharged home with family.      Amount and/or Complexity of Data Reviewed  External Data Reviewed: labs, radiology and notes.  Labs: ordered. Decision-making details documented in ED Course.  Radiology: ordered. Decision-making details documented in ED Course.    Risk  OTC drugs.  Prescription drug management.        Procedure  Procedures     Bud Porter MD  09/29/24 [id number]

## 2024-10-22 ENCOUNTER — APPOINTMENT (OUTPATIENT)
Dept: PRIMARY CARE | Facility: CLINIC | Age: 44
End: 2024-10-22
Payer: COMMERCIAL

## 2024-11-08 DIAGNOSIS — K21.9 GERD WITHOUT ESOPHAGITIS: ICD-10-CM

## 2024-11-08 DIAGNOSIS — I10 PRIMARY HYPERTENSION: ICD-10-CM

## 2024-11-11 RX ORDER — LOSARTAN POTASSIUM 50 MG/1
50 TABLET ORAL DAILY
Qty: 90 TABLET | Refills: 0 | Status: SHIPPED | OUTPATIENT
Start: 2024-11-11

## 2024-11-11 RX ORDER — OMEPRAZOLE 20 MG/1
20 CAPSULE, DELAYED RELEASE ORAL
Qty: 90 CAPSULE | Refills: 0 | Status: SHIPPED | OUTPATIENT
Start: 2024-11-11

## 2024-11-25 ENCOUNTER — OFFICE VISIT (OUTPATIENT)
Dept: URGENT CARE | Facility: URGENT CARE | Age: 44
End: 2024-11-25
Payer: COMMERCIAL

## 2024-11-25 VITALS
RESPIRATION RATE: 24 BRPM | TEMPERATURE: 98.4 F | DIASTOLIC BLOOD PRESSURE: 96 MMHG | WEIGHT: 293 LBS | BODY MASS INDEX: 56.93 KG/M2 | OXYGEN SATURATION: 97 % | SYSTOLIC BLOOD PRESSURE: 147 MMHG | HEART RATE: 89 BPM

## 2024-11-25 DIAGNOSIS — H61.22 LEFT EAR IMPACTED CERUMEN: ICD-10-CM

## 2024-11-25 DIAGNOSIS — J45.21 MILD INTERMITTENT ASTHMA WITH EXACERBATION (HHS-HCC): Primary | ICD-10-CM

## 2024-11-25 PROCEDURE — 3080F DIAST BP >= 90 MM HG: CPT | Performed by: PHYSICIAN ASSISTANT

## 2024-11-25 PROCEDURE — 3077F SYST BP >= 140 MM HG: CPT | Performed by: PHYSICIAN ASSISTANT

## 2024-11-25 PROCEDURE — 99214 OFFICE O/P EST MOD 30 MIN: CPT | Performed by: PHYSICIAN ASSISTANT

## 2024-11-25 PROCEDURE — 94640 AIRWAY INHALATION TREATMENT: CPT | Performed by: PHYSICIAN ASSISTANT

## 2024-11-25 RX ORDER — METHYLPREDNISOLONE 4 MG/1
TABLET ORAL
Qty: 21 TABLET | Refills: 0 | Status: SHIPPED | OUTPATIENT
Start: 2024-11-25 | End: 2024-12-02

## 2024-11-25 RX ORDER — ALBUTEROL SULFATE 0.83 MG/ML
2.5 SOLUTION RESPIRATORY (INHALATION) ONCE
Status: COMPLETED | OUTPATIENT
Start: 2024-11-25 | End: 2024-11-25

## 2024-11-25 RX ORDER — AZITHROMYCIN 250 MG/1
TABLET, FILM COATED ORAL
Qty: 6 TABLET | Refills: 0 | Status: SHIPPED | OUTPATIENT
Start: 2024-11-25

## 2024-11-25 RX ORDER — ASPIRIN 81 MG
5 TABLET, DELAYED RELEASE (ENTERIC COATED) ORAL 2 TIMES DAILY
Qty: 15 ML | Refills: 0 | Status: SHIPPED | OUTPATIENT
Start: 2024-11-25 | End: 2024-11-28

## 2024-11-25 RX ORDER — ALBUTEROL SULFATE 90 UG/1
2 INHALANT RESPIRATORY (INHALATION) EVERY 6 HOURS PRN
Qty: 8.5 G | Refills: 0 | Status: SHIPPED | OUTPATIENT
Start: 2024-11-25

## 2024-11-25 RX ORDER — INHALER, ASSIST DEVICES
SPACER (EA) MISCELLANEOUS
Qty: 1 EACH | Refills: 0 | Status: SHIPPED | OUTPATIENT
Start: 2024-11-25 | End: 2025-11-25

## 2024-11-25 RX ADMIN — ALBUTEROL SULFATE 2.5 MG: 0.83 SOLUTION RESPIRATORY (INHALATION) at 14:58

## 2024-11-25 NOTE — PATIENT INSTRUCTIONS
ASTHMA ACTION PLAN:      Green ZONE well without symptoms.      YELLOW ZONE start of runny nose, congestion, cough, wheeze. Start Albuterol inhaler with spacer 2 puffs every 6 hours x 24 hours then as needed for cough or wheeze.  Be cautious with NSAIDS during asthma exacerbation, use Tylenol as needed. Start Medrol dose josefina as directed, take with food. Monitor BP while on oral steroid due to steroids retaining sodium and increasing BP. Start zpack as directed; take with food and probiotic.     RED ZONE difficulty breathing. Albuterol inhaler with spacer 6 puffs every 20 min x 2 if no improvement go to ER or call Ambulance. If improved after second treatment start 4 puffs every 6hours x 24 hours then 2 puffs every 6 hours x 24 hours and get in to see Family Doctor to assess for need of oral steroids.        Left cerumen impaction- start Debrox 5 drops to left ear twice a day x 3 days, return if no improvement

## 2024-11-25 NOTE — PROGRESS NOTES
Subjective   Patient ID: Rhonda Pizano is a 44 y.o. female with asthma present today with a chief complaint of Illness (Cough, congestion, earache, fever, flank pain from coughing x6 days).    History of Present Illness    Illness    Pt reports body aches, chills, fever up to 100 on Friday 4 days ago, pt is taking ibuprofen and tylenol for rib pain from coughing. Productive cough and occasionally dry spasmatic. Pt reports hospitalization for asthma 7 years ago. Pt is nonsmoker. Pt reports her albuterol is outdated. She tried left over tessalon perles and mucinex with minimal relief. Pt takes two BP meds, took last dose this morning. Pt is not taking singulair for few years. Pt did not take any otc allergy meds. Pt reports hx of vomiting after using Advair. She has tolerated oral steroids and albuterol inhaler with no side effects. Pt has appt with PCP in January unable to get in sooner.    Past Medical History  Allergies as of 11/25/2024 - Reviewed 11/25/2024   Allergen Reaction Noted    Bee venom protein (honey bee) Anaphylaxis 04/20/2023    Lisinopril Other and Cough 10/09/2017    Amlodipine Unknown 04/20/2023    Fluticasone propion-salmeterol Unknown 04/20/2023    Losartan Wheezing 04/20/2023       (Not in a hospital admission)       Past Medical History:   Diagnosis Date    Acute medial meniscal injury of knee, right, initial encounter     Anemia     JEANETTE    Anxiety     Asthma     Depression     GERD (gastroesophageal reflux disease)     Hypertension     Obesity     Wears eyeglasses        No past surgical history on file.     reports that she quit smoking about 30 years ago. Her smoking use included cigarettes. She has never used smokeless tobacco. She reports that she does not currently use alcohol. She reports that she does not currently use drugs.    Review of Systems  Review of Systems                               Objective    Vitals:    11/25/24 1420   BP: (!) 147/96   BP Location: Left arm   Patient  Position: Sitting   BP Cuff Size: Adult long   Pulse: 89   Resp: 24   Temp: 36.9 °C (98.4 °F)   TempSrc: Oral   SpO2: 97%   Weight: (!) 160 kg (352 lb 11.8 oz)     No LMP recorded. Patient has had a hysterectomy.    Physical Exam  Vitals and nursing note reviewed.   Constitutional:       Appearance: Normal appearance. She is not ill-appearing.      Comments: Pleasant white female, obese body habitus   HENT:      Head: Normocephalic and atraumatic.      Right Ear: Tympanic membrane and ear canal normal.      Ears:      Comments: Left cerumen impaction unable to visualize TM     Nose: Congestion and rhinorrhea present.      Mouth/Throat:      Mouth: Mucous membranes are moist.      Pharynx: Posterior oropharyngeal erythema present.   Eyes:      Extraocular Movements: Extraocular movements intact.      Conjunctiva/sclera: Conjunctivae normal.      Pupils: Pupils are equal, round, and reactive to light.   Neck:      Comments: Large neck diameter  Cardiovascular:      Rate and Rhythm: Normal rate and regular rhythm.      Heart sounds: No murmur heard.  Pulmonary:      Effort: Pulmonary effort is normal. No respiratory distress.      Comments: Mildly diminished breath sounds, crackles at bases, spasmatic cough with each deep breath, preneb O2 97%, post neb O2 98% with improved aeration however persistent crackles at bases  Musculoskeletal:      Cervical back: Normal range of motion and neck supple.   Lymphadenopathy:      Cervical: No cervical adenopathy.   Skin:     General: Skin is warm and dry.   Neurological:      General: No focal deficit present.      Mental Status: She is alert and oriented to person, place, and time.   Psychiatric:         Mood and Affect: Mood normal.         Procedures    Point of Care Test & Imaging Results from this visit  No results found for this visit on 11/25/24.   No results found.    Diagnostic study results (if any) were reviewed by Maureen Longo PA-C.    Assessment/Plan    Allergies, medications, history, and pertinent labs/EKGs/Imaging reviewed by Maueren Longo PA-C.     Medical Decision Making  43 yo F presents with body aches, chills, fever up to 100 on Friday 4 days ago, productive spasmatic cough with rib pain from coughing. Vitals elevated /95, afebrile, SpO2 97%. Exam remarkable for nasal congestion, rhinorrhea, pharyngeal erythema, Mildly diminished breath sounds, crackles at bases, spasmatic cough with each deep breath, preneb O2 97%, post neb O2 98% with improved aeration however persistent crackles at bases.  Discussed asthma exacerbation- Advised to start Albuterol inhaler 2 puffs every 6h as needed for cough or wheeze, Be cautious with NSAIDS during asthma exacerbation, use Tylenol as needed. Start Medrol dose josefina as directed, take with food. Monitor BP while on oral steroid due to steroids retaining sodium and increasing BP. Start zpack as directed; take with food and probiotic.   Incidental finding of Left cerumen impaction- start Debrox 5 drops to left ear twice a day x 3 days, return if no improvement.    Orders and Diagnoses  Diagnoses and all orders for this visit:  Mild intermittent asthma with exacerbation ([facility])  -     albuterol 2.5 mg /3 mL (0.083 %) nebulizer solution 2.5 mg  -     albuterol (ProAir HFA) 90 mcg/actuation inhaler; Inhale 2 puffs every 6 hours if needed for wheezing or shortness of breath. Use with spacer  -     inhalational spacing device (BreatheRite MDI Spacer) inhaler; Use as instructed  -     methylPREDNISolone (Medrol Dospak) 4 mg tablets; Follow schedule on package instructions  -     azithromycin (Zithromax) 250 mg tablet; Take 2 tabs day 1, take 1 tab day 2-5  Left ear impacted cerumen  -     carbamide peroxide (Debrox) 6.5 % otic solution; Administer 5 drops into the left ear 2 times a day for 3 days.      Medical Admin Record  Administrations This Visit       albuterol 2.5 mg /3 mL (0.083 %) nebulizer solution 2.5  mg       Admin Date  11/25/2024 Action  Given Dose  2.5 mg Route  nebulization Documented By  Ijeoma Das MA                    Patient disposition: Home    Electronically signed by Maureen Longo PA-C  3:18 PM

## 2024-11-25 NOTE — LETTER
November 25, 2024     Patient: Rhonda Pizano   YOB: 1980   Date of Visit: 11/25/2024       To Whom It May Concern:    Rhonda Pizano was seen in my clinic on 11/25/2024 at 2:30 pm. Please excuse Rhonda for her absence from work on 11/25/24 and 11/26/24. May return to work 11/27/24 if cough improving and fever free    If you have any questions or concerns, please don't hesitate to call.         Sincerely,         Maureen Longo PA-C        CC: No Recipients

## 2024-11-26 ENCOUNTER — APPOINTMENT (OUTPATIENT)
Dept: PRIMARY CARE | Facility: CLINIC | Age: 44
End: 2024-11-26
Payer: COMMERCIAL

## 2024-12-26 ENCOUNTER — OFFICE VISIT (OUTPATIENT)
Dept: PRIMARY CARE | Facility: CLINIC | Age: 44
End: 2024-12-26
Payer: COMMERCIAL

## 2024-12-26 VITALS
DIASTOLIC BLOOD PRESSURE: 86 MMHG | WEIGHT: 293 LBS | OXYGEN SATURATION: 100 % | HEART RATE: 72 BPM | SYSTOLIC BLOOD PRESSURE: 124 MMHG | TEMPERATURE: 96.5 F | BODY MASS INDEX: 56.52 KG/M2

## 2024-12-26 DIAGNOSIS — J20.9 ACUTE BRONCHITIS, UNSPECIFIED ORGANISM: ICD-10-CM

## 2024-12-26 DIAGNOSIS — G47.33 OSA (OBSTRUCTIVE SLEEP APNEA): ICD-10-CM

## 2024-12-26 DIAGNOSIS — E66.01 SEVERE OBESITY (BMI >= 40) (MULTI): ICD-10-CM

## 2024-12-26 DIAGNOSIS — J45.909 ASTHMA, UNSPECIFIED ASTHMA SEVERITY, UNSPECIFIED WHETHER COMPLICATED, UNSPECIFIED WHETHER PERSISTENT (HHS-HCC): ICD-10-CM

## 2024-12-26 DIAGNOSIS — K21.9 GERD WITHOUT ESOPHAGITIS: Primary | ICD-10-CM

## 2024-12-26 PROCEDURE — 3074F SYST BP LT 130 MM HG: CPT

## 2024-12-26 PROCEDURE — 1036F TOBACCO NON-USER: CPT

## 2024-12-26 PROCEDURE — 99214 OFFICE O/P EST MOD 30 MIN: CPT

## 2024-12-26 PROCEDURE — 3079F DIAST BP 80-89 MM HG: CPT

## 2024-12-26 RX ORDER — FLUTICASONE FUROATE AND VILANTEROL 200; 25 UG/1; UG/1
1 POWDER RESPIRATORY (INHALATION) DAILY
Qty: 28 EACH | Refills: 0 | Status: SHIPPED | OUTPATIENT
Start: 2024-12-26

## 2024-12-26 ASSESSMENT — ENCOUNTER SYMPTOMS: COUGH: 1

## 2024-12-26 NOTE — PATIENT INSTRUCTIONS
[facility]    Thank you for coming in today, if any questions or concerns arise, please call my office.   Clem Schmitt, GRISELDA-CNP

## 2024-12-26 NOTE — PROGRESS NOTES
Subjective   Patient ID: Rhonda Pizano is a 44 y.o. female who presents for Cough (Semi productive cough- has been coughing since having pneumonia in [facility]- she was diagnosed on 11/25/24//She states she was also told she had asthma exacerbation as well. ) and Weight Loss (She would like to discuss shots).  Obesity Assessment  Obesity with BMI and comorbidities Obstructive Sleep Apnea, Hyperlipidemia.    Weight loss management includes exercise, healthier diet, and taking weight loss vitamins for the past 6 months.    Obesity Plan  1. Discussed proper diet (low fat, low sodium, high fiber) with patient.    2. Discussed need for regular exercise (3 times per week, 20 minutes per session) with patient.    3. Start GLP-1 for a month, follow up with me.  4. Follow up in 1 month and as needed.    Bronchitis  Dry cough  Asthma, start breo    Cough        Vitals:    12/26/24 1554   BP: 124/86   Pulse: 72   Temp: 35.8 °C (96.5 °F)   SpO2: 100%       Review of Systems   Respiratory:  Positive for cough.        Objective   Physical Exam    Assessment/Plan   Problem List Items Addressed This Visit       Asthma    Relevant Medications    fluticasone furoate-vilanteroL (Breo Ellipta) 200-25 mcg/dose inhaler    GERD without esophagitis - Primary    LISE (obstructive sleep apnea)    Relevant Medications    semaglutide 0.25 mg or 0.5 mg (2 mg/3 mL) pen injector    Severe obesity (BMI >= 40) (Multi)    Relevant Medications    semaglutide 0.25 mg or 0.5 mg (2 mg/3 mL) pen injector    Acute bronchitis            Thank you for coming in today, please call my office if you have any concerns or questions.     Clem VILLALOBOS, CNP

## 2024-12-27 ENCOUNTER — TELEPHONE (OUTPATIENT)
Dept: PRIMARY CARE | Facility: CLINIC | Age: 44
End: 2024-12-27
Payer: COMMERCIAL

## 2024-12-27 DIAGNOSIS — J45.21 MILD INTERMITTENT ASTHMA WITH EXACERBATION (HHS-HCC): ICD-10-CM

## 2024-12-27 RX ORDER — ALBUTEROL SULFATE 90 UG/1
2 INHALANT RESPIRATORY (INHALATION) EVERY 6 HOURS PRN
Qty: 8.5 G | Refills: 0 | Status: SHIPPED | OUTPATIENT
Start: 2024-12-27

## 2024-12-27 NOTE — TELEPHONE ENCOUNTER
Trying to do a prior auth for the albuterol (pro air) and this is what it is coming up saying;    . Can your patient be treated with a formulary drug? Available Formulary Alternatives: albuterol sulfate CFC-free aerosol (except NDCs [id number], [id number]), levalbuterol tartrate CFC-free aerosol [NOTE: If yes, provide your patient with a new prescription for the formulary product.]       Do you want to switch this to a covered med?

## 2025-01-02 ENCOUNTER — APPOINTMENT (OUTPATIENT)
Dept: PRIMARY CARE | Facility: CLINIC | Age: 45
End: 2025-01-02
Payer: COMMERCIAL

## 2025-01-14 ENCOUNTER — APPOINTMENT (OUTPATIENT)
Dept: PRIMARY CARE | Facility: CLINIC | Age: 45
End: 2025-01-14
Payer: COMMERCIAL

## 2025-01-18 ENCOUNTER — PATIENT MESSAGE (OUTPATIENT)
Dept: PRIMARY CARE | Facility: CLINIC | Age: 45
End: 2025-01-18
Payer: COMMERCIAL

## 2025-01-18 DIAGNOSIS — J45.909 ASTHMA, UNSPECIFIED ASTHMA SEVERITY, UNSPECIFIED WHETHER COMPLICATED, UNSPECIFIED WHETHER PERSISTENT (HHS-HCC): ICD-10-CM

## 2025-01-20 RX ORDER — FLUTICASONE FUROATE AND VILANTEROL 200; 25 UG/1; UG/1
1 POWDER RESPIRATORY (INHALATION) DAILY
Qty: 28 EACH | Refills: 0 | Status: SHIPPED | OUTPATIENT
Start: 2025-01-20

## 2025-01-28 ENCOUNTER — TELEPHONE (OUTPATIENT)
Dept: PRIMARY CARE | Facility: CLINIC | Age: 45
End: 2025-01-28
Payer: COMMERCIAL

## 2025-01-29 DIAGNOSIS — J45.909 ASTHMA, UNSPECIFIED ASTHMA SEVERITY, UNSPECIFIED WHETHER COMPLICATED, UNSPECIFIED WHETHER PERSISTENT (HHS-HCC): Primary | ICD-10-CM

## 2025-01-29 RX ORDER — BUDESONIDE AND FORMOTEROL FUMARATE DIHYDRATE 160; 4.5 UG/1; UG/1
2 AEROSOL RESPIRATORY (INHALATION)
Qty: 10.2 G | Refills: 11 | Status: SHIPPED | OUTPATIENT
Start: 2025-01-29 | End: 2026-01-29

## 2025-01-30 DIAGNOSIS — K21.9 GERD WITHOUT ESOPHAGITIS: ICD-10-CM

## 2025-01-30 DIAGNOSIS — I10 PRIMARY HYPERTENSION: ICD-10-CM

## 2025-01-30 RX ORDER — LOSARTAN POTASSIUM 50 MG/1
50 TABLET ORAL DAILY
Qty: 90 TABLET | Refills: 0 | Status: SHIPPED | OUTPATIENT
Start: 2025-01-30

## 2025-01-30 RX ORDER — OMEPRAZOLE 20 MG/1
20 CAPSULE, DELAYED RELEASE ORAL
Qty: 90 CAPSULE | Refills: 0 | Status: SHIPPED | OUTPATIENT
Start: 2025-01-30

## 2025-02-06 DIAGNOSIS — I10 PRIMARY HYPERTENSION: ICD-10-CM

## 2025-02-10 RX ORDER — HYDROCHLOROTHIAZIDE 12.5 MG/1
12.5 TABLET ORAL DAILY
Qty: 90 TABLET | Refills: 1 | Status: SHIPPED | OUTPATIENT
Start: 2025-02-10

## 2025-02-28 DIAGNOSIS — J45.909 ASTHMA, UNSPECIFIED ASTHMA SEVERITY, UNSPECIFIED WHETHER COMPLICATED, UNSPECIFIED WHETHER PERSISTENT (HHS-HCC): ICD-10-CM

## 2025-03-03 RX ORDER — MONTELUKAST SODIUM 10 MG/1
10 TABLET ORAL NIGHTLY
Qty: 90 TABLET | Refills: 1 | Status: SHIPPED | OUTPATIENT
Start: 2025-03-03

## 2025-05-04 DIAGNOSIS — I10 PRIMARY HYPERTENSION: ICD-10-CM

## 2025-05-04 DIAGNOSIS — K21.9 GERD WITHOUT ESOPHAGITIS: ICD-10-CM

## 2025-05-05 RX ORDER — OMEPRAZOLE 20 MG/1
20 CAPSULE, DELAYED RELEASE ORAL
Qty: 90 CAPSULE | Refills: 0 | Status: SHIPPED | OUTPATIENT
Start: 2025-05-05

## 2025-05-05 RX ORDER — LOSARTAN POTASSIUM 50 MG/1
50 TABLET ORAL DAILY
Qty: 90 TABLET | Refills: 0 | Status: SHIPPED | OUTPATIENT
Start: 2025-05-05

## 2025-05-19 ENCOUNTER — APPOINTMENT (OUTPATIENT)
Dept: PRIMARY CARE | Facility: CLINIC | Age: 45
End: 2025-05-19
Payer: COMMERCIAL

## 2025-05-23 ENCOUNTER — APPOINTMENT (OUTPATIENT)
Dept: RADIOLOGY | Facility: HOSPITAL | Age: 45
End: 2025-05-23
Payer: COMMERCIAL

## 2025-05-23 ENCOUNTER — APPOINTMENT (OUTPATIENT)
Dept: CARDIOLOGY | Facility: HOSPITAL | Age: 45
End: 2025-05-23
Payer: COMMERCIAL

## 2025-05-23 ENCOUNTER — HOSPITAL ENCOUNTER (EMERGENCY)
Facility: HOSPITAL | Age: 45
Discharge: HOME | End: 2025-05-23
Payer: COMMERCIAL

## 2025-05-23 VITALS
TEMPERATURE: 97.5 F | WEIGHT: 293 LBS | HEIGHT: 61 IN | DIASTOLIC BLOOD PRESSURE: 84 MMHG | BODY MASS INDEX: 55.32 KG/M2 | RESPIRATION RATE: 18 BRPM | HEART RATE: 67 BPM | SYSTOLIC BLOOD PRESSURE: 137 MMHG | OXYGEN SATURATION: 99 %

## 2025-05-23 DIAGNOSIS — R10.13 EPIGASTRIC PAIN: Primary | ICD-10-CM

## 2025-05-23 LAB
ALBUMIN SERPL BCP-MCNC: 4 G/DL (ref 3.4–5)
ALP SERPL-CCNC: 73 U/L (ref 33–110)
ALT SERPL W P-5'-P-CCNC: 14 U/L (ref 7–45)
ANION GAP SERPL CALC-SCNC: 16 MMOL/L (ref 10–20)
APPEARANCE UR: CLEAR
AST SERPL W P-5'-P-CCNC: 17 U/L (ref 9–39)
BACTERIA #/AREA URNS AUTO: ABNORMAL /HPF
BASOPHILS # BLD AUTO: 0.07 X10*3/UL (ref 0–0.1)
BASOPHILS NFR BLD AUTO: 0.5 %
BILIRUB SERPL-MCNC: 0.5 MG/DL (ref 0–1.2)
BILIRUB UR STRIP.AUTO-MCNC: NEGATIVE MG/DL
BUN SERPL-MCNC: 9 MG/DL (ref 6–23)
CALCIUM SERPL-MCNC: 8.8 MG/DL (ref 8.6–10.3)
CARDIAC TROPONIN I PNL SERPL HS: 3 NG/L (ref 0–13)
CARDIAC TROPONIN I PNL SERPL HS: <3 NG/L (ref 0–13)
CHLORIDE SERPL-SCNC: 104 MMOL/L (ref 98–107)
CO2 SERPL-SCNC: 22 MMOL/L (ref 21–32)
COLOR UR: YELLOW
CREAT SERPL-MCNC: 0.8 MG/DL (ref 0.5–1.05)
EGFRCR SERPLBLD CKD-EPI 2021: >90 ML/MIN/1.73M*2
EOSINOPHIL # BLD AUTO: 0.07 X10*3/UL (ref 0–0.7)
EOSINOPHIL NFR BLD AUTO: 0.5 %
ERYTHROCYTE [DISTWIDTH] IN BLOOD BY AUTOMATED COUNT: 14.5 % (ref 11.5–14.5)
GLUCOSE SERPL-MCNC: 86 MG/DL (ref 74–99)
GLUCOSE UR STRIP.AUTO-MCNC: NORMAL MG/DL
HCT VFR BLD AUTO: 43.1 % (ref 36–46)
HGB BLD-MCNC: 14 G/DL (ref 12–16)
IMM GRANULOCYTES # BLD AUTO: 0.06 X10*3/UL (ref 0–0.7)
IMM GRANULOCYTES NFR BLD AUTO: 0.4 % (ref 0–0.9)
INR PPP: 1.2 (ref 0.9–1.1)
KETONES UR STRIP.AUTO-MCNC: NEGATIVE MG/DL
LACTATE SERPL-SCNC: 0.8 MMOL/L (ref 0.4–2)
LEUKOCYTE ESTERASE UR QL STRIP.AUTO: NEGATIVE
LIPASE SERPL-CCNC: 13 U/L (ref 9–82)
LYMPHOCYTES # BLD AUTO: 3.28 X10*3/UL (ref 1.2–4.8)
LYMPHOCYTES NFR BLD AUTO: 24.5 %
MAGNESIUM SERPL-MCNC: 2.23 MG/DL (ref 1.6–2.4)
MCH RBC QN AUTO: 27.6 PG (ref 26–34)
MCHC RBC AUTO-ENTMCNC: 32.5 G/DL (ref 32–36)
MCV RBC AUTO: 85 FL (ref 80–100)
MONOCYTES # BLD AUTO: 0.79 X10*3/UL (ref 0.1–1)
MONOCYTES NFR BLD AUTO: 5.9 %
MUCOUS THREADS #/AREA URNS AUTO: ABNORMAL /LPF
NEUTROPHILS # BLD AUTO: 9.12 X10*3/UL (ref 1.2–7.7)
NEUTROPHILS NFR BLD AUTO: 68.2 %
NITRITE UR QL STRIP.AUTO: NEGATIVE
NRBC BLD-RTO: 0 /100 WBCS (ref 0–0)
PH UR STRIP.AUTO: 6 [PH]
PLATELET # BLD AUTO: 350 X10*3/UL (ref 150–450)
POTASSIUM SERPL-SCNC: 4.2 MMOL/L (ref 3.5–5.3)
PROT SERPL-MCNC: 6.7 G/DL (ref 6.4–8.2)
PROT UR STRIP.AUTO-MCNC: ABNORMAL MG/DL
PROTHROMBIN TIME: 13.1 SECONDS (ref 9.8–12.4)
RBC # BLD AUTO: 5.08 X10*6/UL (ref 4–5.2)
RBC # UR STRIP.AUTO: NEGATIVE MG/DL
RBC #/AREA URNS AUTO: ABNORMAL /HPF
SODIUM SERPL-SCNC: 138 MMOL/L (ref 136–145)
SP GR UR STRIP.AUTO: >1.05
SQUAMOUS #/AREA URNS AUTO: ABNORMAL /HPF
UROBILINOGEN UR STRIP.AUTO-MCNC: NORMAL MG/DL
WBC # BLD AUTO: 13.4 X10*3/UL (ref 4.4–11.3)
WBC #/AREA URNS AUTO: ABNORMAL /HPF

## 2025-05-23 PROCEDURE — 74177 CT ABD & PELVIS W/CONTRAST: CPT | Mod: FOREIGN READ | Performed by: RADIOLOGY

## 2025-05-23 PROCEDURE — 36415 COLL VENOUS BLD VENIPUNCTURE: CPT | Performed by: HEALTH CARE PROVIDER

## 2025-05-23 PROCEDURE — 2500000004 HC RX 250 GENERAL PHARMACY W/ HCPCS (ALT 636 FOR OP/ED): Performed by: HEALTH CARE PROVIDER

## 2025-05-23 PROCEDURE — 96374 THER/PROPH/DIAG INJ IV PUSH: CPT | Mod: 59

## 2025-05-23 PROCEDURE — 81001 URINALYSIS AUTO W/SCOPE: CPT | Performed by: HEALTH CARE PROVIDER

## 2025-05-23 PROCEDURE — 93005 ELECTROCARDIOGRAM TRACING: CPT

## 2025-05-23 PROCEDURE — 80053 COMPREHEN METABOLIC PANEL: CPT | Performed by: HEALTH CARE PROVIDER

## 2025-05-23 PROCEDURE — 83605 ASSAY OF LACTIC ACID: CPT | Performed by: HEALTH CARE PROVIDER

## 2025-05-23 PROCEDURE — 83735 ASSAY OF MAGNESIUM: CPT | Performed by: HEALTH CARE PROVIDER

## 2025-05-23 PROCEDURE — 74177 CT ABD & PELVIS W/CONTRAST: CPT

## 2025-05-23 PROCEDURE — 76705 ECHO EXAM OF ABDOMEN: CPT | Mod: FOREIGN READ | Performed by: RADIOLOGY

## 2025-05-23 PROCEDURE — 76705 ECHO EXAM OF ABDOMEN: CPT

## 2025-05-23 PROCEDURE — 83690 ASSAY OF LIPASE: CPT | Performed by: HEALTH CARE PROVIDER

## 2025-05-23 PROCEDURE — 96375 TX/PRO/DX INJ NEW DRUG ADDON: CPT

## 2025-05-23 PROCEDURE — 96361 HYDRATE IV INFUSION ADD-ON: CPT

## 2025-05-23 PROCEDURE — 84484 ASSAY OF TROPONIN QUANT: CPT | Performed by: HEALTH CARE PROVIDER

## 2025-05-23 PROCEDURE — 2550000001 HC RX 255 CONTRASTS: Performed by: HEALTH CARE PROVIDER

## 2025-05-23 PROCEDURE — 85025 COMPLETE CBC W/AUTO DIFF WBC: CPT | Performed by: HEALTH CARE PROVIDER

## 2025-05-23 PROCEDURE — 85610 PROTHROMBIN TIME: CPT | Performed by: HEALTH CARE PROVIDER

## 2025-05-23 PROCEDURE — 99285 EMERGENCY DEPT VISIT HI MDM: CPT | Mod: 25

## 2025-05-23 RX ORDER — ONDANSETRON HYDROCHLORIDE 2 MG/ML
4 INJECTION, SOLUTION INTRAVENOUS ONCE
Status: COMPLETED | OUTPATIENT
Start: 2025-05-23 | End: 2025-05-23

## 2025-05-23 RX ORDER — PANTOPRAZOLE SODIUM 40 MG/10ML
40 INJECTION, POWDER, LYOPHILIZED, FOR SOLUTION INTRAVENOUS ONCE
Status: COMPLETED | OUTPATIENT
Start: 2025-05-23 | End: 2025-05-23

## 2025-05-23 RX ORDER — LORAZEPAM 2 MG/ML
0.5 INJECTION INTRAMUSCULAR ONCE
Status: COMPLETED | OUTPATIENT
Start: 2025-05-23 | End: 2025-05-23

## 2025-05-23 RX ORDER — HYDROMORPHONE HYDROCHLORIDE 1 MG/ML
1 INJECTION, SOLUTION INTRAMUSCULAR; INTRAVENOUS; SUBCUTANEOUS ONCE
Status: COMPLETED | OUTPATIENT
Start: 2025-05-23 | End: 2025-05-23

## 2025-05-23 RX ORDER — SUCRALFATE 1 G/10ML
1 SUSPENSION ORAL 4 TIMES DAILY
Qty: 400 ML | Refills: 0 | Status: SHIPPED | OUTPATIENT
Start: 2025-05-23 | End: 2025-06-02

## 2025-05-23 RX ORDER — DICYCLOMINE HYDROCHLORIDE 20 MG/1
20 TABLET ORAL
Qty: 20 TABLET | Refills: 0 | Status: SHIPPED | OUTPATIENT
Start: 2025-05-23 | End: 2025-05-28

## 2025-05-23 RX ORDER — KETOROLAC TROMETHAMINE 15 MG/ML
15 INJECTION, SOLUTION INTRAMUSCULAR; INTRAVENOUS ONCE
Status: COMPLETED | OUTPATIENT
Start: 2025-05-23 | End: 2025-05-23

## 2025-05-23 RX ADMIN — SODIUM CHLORIDE, SODIUM LACTATE, POTASSIUM CHLORIDE, AND CALCIUM CHLORIDE 1000 ML: .6; .31; .03; .02 INJECTION, SOLUTION INTRAVENOUS at 18:21

## 2025-05-23 RX ADMIN — HYDROMORPHONE HYDROCHLORIDE 1 MG: 1 INJECTION, SOLUTION INTRAMUSCULAR; INTRAVENOUS; SUBCUTANEOUS at 18:21

## 2025-05-23 RX ADMIN — KETOROLAC TROMETHAMINE 15 MG: 15 INJECTION, SOLUTION INTRAMUSCULAR; INTRAVENOUS at 18:21

## 2025-05-23 RX ADMIN — PANTOPRAZOLE SODIUM 40 MG: 40 INJECTION, POWDER, FOR SOLUTION INTRAVENOUS at 18:21

## 2025-05-23 RX ADMIN — IOHEXOL 75 ML: 350 INJECTION, SOLUTION INTRAVENOUS at 20:05

## 2025-05-23 RX ADMIN — LORAZEPAM 0.5 MG: 2 INJECTION INTRAMUSCULAR; INTRAVENOUS at 19:38

## 2025-05-23 RX ADMIN — ONDANSETRON 4 MG: 2 INJECTION, SOLUTION INTRAMUSCULAR; INTRAVENOUS at 18:21

## 2025-05-23 ASSESSMENT — PAIN - FUNCTIONAL ASSESSMENT: PAIN_FUNCTIONAL_ASSESSMENT: 0-10

## 2025-05-23 ASSESSMENT — COLUMBIA-SUICIDE SEVERITY RATING SCALE - C-SSRS
1. IN THE PAST MONTH, HAVE YOU WISHED YOU WERE DEAD OR WISHED YOU COULD GO TO SLEEP AND NOT WAKE UP?: NO
6. HAVE YOU EVER DONE ANYTHING, STARTED TO DO ANYTHING, OR PREPARED TO DO ANYTHING TO END YOUR LIFE?: NO
2. HAVE YOU ACTUALLY HAD ANY THOUGHTS OF KILLING YOURSELF?: NO

## 2025-05-23 ASSESSMENT — LIFESTYLE VARIABLES
EVER FELT BAD OR GUILTY ABOUT YOUR DRINKING: NO
EVER HAD A DRINK FIRST THING IN THE MORNING TO STEADY YOUR NERVES TO GET RID OF A HANGOVER: NO
HAVE YOU EVER FELT YOU SHOULD CUT DOWN ON YOUR DRINKING: NO
TOTAL SCORE: 0
HAVE PEOPLE ANNOYED YOU BY CRITICIZING YOUR DRINKING: NO

## 2025-05-23 ASSESSMENT — PAIN SCALES - GENERAL: PAINLEVEL_OUTOF10: 10 - WORST POSSIBLE PAIN

## 2025-05-23 NOTE — ED PROVIDER NOTES
HPI   Chief Complaint   Patient presents with    Abdominal Pain       CC: Acute diffuse abdominal pain   HPI:   44-year-old obese female presents ED complaining of diffuse upper abdominal pain that started in the last 24 to 48 hours.  Patient states that she was seen by her primary care provider earlier today and was instructed to come to the emergency room for further evaluation.  Patient notes pain is mostly in the diffuse upper abdominal region, she does have a history of hiatal hernia and denies any history of cholelithiasis or nephrolithiasis, she denies having any fevers, does report nausea denies any vomiting.  Pain is constant does radiate around to the back, she denies having any chest pain or shortness of breath, she does have a history of hypertension hyperlipidemia, past surgical history includes 2 C-sections and a hysterectomy.    Additional Limitations to History:   External Records Reviewed: I reviewed recent and relevant outside records including   History Obtained From:     Past Medical History: Per HPI  Medications: Reviewed in EMR and with patient  Allergies:  Reviewed in EMR  Past Surgical History:   Social History:     ------------------------------------------------------------------------------------------------------  Physical Exam:  --Vital signs reviewed in nursing triage note, EMR flow sheets, and at patient's bedside  GEN:  A&Ox3, no acute distress, appears comfortable.  Conversational and appropriate.  No confusion or gross mental status changes.  EYES: EOMI, non-injected sclera.  ENT: Moist mucous membranes, no apparent injuries or lesions.   CARDIO: Normal rate and regular rhythm. No murmurs, rubs, or gallops.  2+ equal pulses of the distal extremities.   PULM: Clear to auscultation bilaterally. No rales, rhonchi, or wheezes. Good symmetric chest expansion.  GI: Soft, diffusely tender mostly in the upper abdominal region negative Rodriges sign, non-distended. No rebound tenderness or  guarding.  SKIN: Warm and dry, no rashes or lesions.  MSK: ROM intact the extremities without contractures.   EXT: No peripheral edema, contusions, or wounds.   NEURO: Cranial nerves II-XII grossly intact. Sensation to light touch intact and equal bilaterally in upper and lower extremities.  Symmetric 5/5 strength in upper and lower extremities.  PSYCH: Appropriate mood and behavior, converses and responds appropriately during exam.  -------------------------------------------------------------------------------------------------------      Differential Diagnoses Considered:   Chronic Medical Conditions Significantly Affecting Care:   Diagnostic testing considered: [PERC, D-Dimer, PECARN, etc.]    - EKG interpreted by myself sinus bradycardia ventricular rate 57 ND interval 162 normal QRS duration no prolonged QT/QTc no obvious ST elevation, depression or acute ischemic findings, normal axis.  - I independently interpreted: [CXR, CT, POCUS, etc. including your interpretation]  - Labs notable for     Escalation of Care: Appropriate for  Social Determinants of Health Significantly Affecting Care: [Homelessness, lacking transportation, uninsured, unable to afford medications]  Prescription Drug Consideration: [Antibiotics, antivirals, pain medications, etc.]  Discussion of Management with Other Providers:  I discussed the patient/results with: [admitting team, consultant, radiologist, social work, EPAT, case management, PT/OT, RT, PCP, etc.]      Gerardo Nascimento PA-C              Patient History   Medical History[1]  Surgical History[2]  Family History[3]  Social History[4]    Physical Exam   ED Triage Vitals [05/23/25 1754]   Temperature Heart Rate Respirations BP   36.4 °C (97.5 °F) 76 18 (!) 195/113      Pulse Ox Temp Source Heart Rate Source Patient Position   99 % Temporal Monitor --      BP Location FiO2 (%)     -- --       Physical Exam      ED Course & MDM   Diagnoses as of 05/24/25 1546   Epigastric pain                  No data recorded     [place] Coma Scale Score: 15 (25 2255 : Charlene Hallman RN)                           Medical Decision Making  44-year-old female with acute onset of diffuse upper abdominal tenderness possibly in the setting of GERD with esophagitis.  There is low suspicion for acute coronary event or pulmonary emboli, there is no evidence suggesting acute cholecystitis ascending cholangitis or acute surgical abdomen, normal serial troponins and no ischemic changes laboratory workup appears essentially unremarkable, normal LFTs and lactate lipase, she is resting comfortably and she was given IV fluids, Toradol Dilaudid Zofran Protonix and is afebrile nontoxic-appearing at this time I advised patient to avoid any possible triggering foods, she is going to try Bentyl and Carafate and follow-up with gastroenterology she understands instructions return to ED if she continues to develop any abdominal pain if she develops any fevers chills chest pain shortness of breath        Procedure  Procedures       Gerardo Nascimento PA-C  [phone]       [1]   Past Medical History:  Diagnosis Date    Acute medial meniscal injury of knee, right, initial encounter     Anemia     JEANETTE    Anxiety     Asthma     Depression     GERD (gastroesophageal reflux disease)     Hypertension     Obesity     Wears eyeglasses    [2] History reviewed. No pertinent surgical history.  [3] No family history on file.  [4]   Social History  Tobacco Use    Smoking status: Former     Current packs/day: 0.00     Types: Cigarettes     Quit date:      Years since quittin.4    Smokeless tobacco: Never   Vaping Use    Vaping status: Never Used   Substance Use Topics    Alcohol use: Not Currently    Drug use: Not Currently        Gerardo Nascimento PA-C  [phone]

## 2025-05-23 NOTE — ED TRIAGE NOTES
Pt presents to ED via POV from home for potential gallbladder issues.  Pt states her NP told her to come to the ER because she had a positive fernandez sign.  Pt endsorses abdominal pain that wraps around to the back, nausea and vomiting.

## 2025-05-24 LAB — HOLD SPECIMEN: NORMAL

## 2025-06-04 LAB
ATRIAL RATE: 57 BPM
P AXIS: 39 DEGREES
P OFFSET: 194 MS
P ONSET: 143 MS
PR INTERVAL: 162 MS
Q ONSET: 224 MS
QRS COUNT: 10 BEATS
QRS DURATION: 86 MS
QT INTERVAL: 418 MS
QTC CALCULATION(BAZETT): 406 MS
QTC FREDERICIA: 411 MS
R AXIS: 8 DEGREES
T AXIS: 46 DEGREES
T OFFSET: 433 MS
VENTRICULAR RATE: 57 BPM

## 2025-06-14 ENCOUNTER — HOSPITAL ENCOUNTER (OUTPATIENT)
Dept: RADIOLOGY | Facility: HOSPITAL | Age: 45
Discharge: HOME | End: 2025-06-14
Payer: COMMERCIAL

## 2025-06-14 ENCOUNTER — APPOINTMENT (OUTPATIENT)
Dept: RADIOLOGY | Facility: HOSPITAL | Age: 45
End: 2025-06-14
Payer: COMMERCIAL

## 2025-06-14 DIAGNOSIS — E04.1 NONTOXIC SINGLE THYROID NODULE: ICD-10-CM

## 2025-06-14 PROCEDURE — 76536 US EXAM OF HEAD AND NECK: CPT | Performed by: RADIOLOGY

## 2025-06-14 PROCEDURE — 76536 US EXAM OF HEAD AND NECK: CPT

## 2025-08-02 DIAGNOSIS — I10 PRIMARY HYPERTENSION: ICD-10-CM

## 2025-08-02 DIAGNOSIS — K21.9 GERD WITHOUT ESOPHAGITIS: ICD-10-CM

## 2025-08-04 RX ORDER — OMEPRAZOLE 20 MG/1
20 CAPSULE, DELAYED RELEASE ORAL
Qty: 90 CAPSULE | Refills: 0 | Status: SHIPPED | OUTPATIENT
Start: 2025-08-04

## 2025-08-04 RX ORDER — LOSARTAN POTASSIUM 50 MG/1
50 TABLET ORAL DAILY
Qty: 90 TABLET | Refills: 0 | Status: SHIPPED | OUTPATIENT
Start: 2025-08-04

## 2026-01-22 ENCOUNTER — APPOINTMENT (OUTPATIENT)
Dept: ENDOCRINOLOGY | Facility: CLINIC | Age: 46
End: 2026-01-22
Payer: COMMERCIAL

## (undated) DEVICE — TOWEL PACK, STERILE, 4/PACK, BLUE

## (undated) DEVICE — SOLUTION, IRRI GATION, LACTATED RINGERS, 3000 ML, BAG

## (undated) DEVICE — GLOVE, SURGICAL, PROTEXIS PI MICRO, 8.0, PF, LF

## (undated) DEVICE — DRAPE, SHEET, U, STERI DRAPE, 47 X 51 IN, DISPOSABLE, STERILE

## (undated) DEVICE — PROBE, ARTHROSCOPIC ENERGY 90 DEG WITH SUCTION

## (undated) DEVICE — DRESSING, GAUZE, PETROLATUM, PATCH, XEROFORM, 1 X 8 IN, STERILE

## (undated) DEVICE — BUR, GREAT WHITE, 4.2MM

## (undated) DEVICE — GOWN, SURGICAL, UROLOGY, IMPERVIOUS, XLONG, XLARGE, DISPOSABLE

## (undated) DEVICE — CUFF, TOURNIQUET, 34 X 4, SNGL PORT/SNGL BLADDER, DISP, LF

## (undated) DEVICE — DRAPE PACK, ORTHOPEDIC, ARTHROSCOPY, CUSTOM, GEAUGA

## (undated) DEVICE — MAT, FLOOR, SUCTION, LOW PROFILE, 50 X 34

## (undated) DEVICE — SUTURE, ETHILON, 3-0, 18 IN, PS1, BLACK

## (undated) DEVICE — DRAPE, TIBURON, SPLIT SHEET, REINF ADH STRIP, 77X122

## (undated) DEVICE — TUBING, ARTHROSCOPIC INFLOW, 10K

## (undated) DEVICE — GLOVE, SURGICAL, PROTEXIS PI BLUE W/NEUTHERA, 8.0, PF, LF

## (undated) DEVICE — NEEDLE, SPINAL, QUINCKE, 18 G X 3.5 IN, PINK HUB